# Patient Record
Sex: MALE | Race: WHITE | NOT HISPANIC OR LATINO | URBAN - METROPOLITAN AREA
[De-identification: names, ages, dates, MRNs, and addresses within clinical notes are randomized per-mention and may not be internally consistent; named-entity substitution may affect disease eponyms.]

---

## 2021-10-14 ENCOUNTER — HISTORICAL (OUTPATIENT)
Dept: ADMINISTRATIVE | Facility: HOSPITAL | Age: 71
End: 2021-10-14

## 2021-10-14 LAB
ABS NEUT (OLG): 3.8 X10(3)/MCL (ref 2.1–9.2)
ALBUMIN SERPL-MCNC: 4.1 GM/DL (ref 3.4–4.8)
ALBUMIN/GLOB SERPL: 1.3 RATIO (ref 1.1–2)
ALP SERPL-CCNC: 79 UNIT/L (ref 40–150)
ALT SERPL-CCNC: 13 UNIT/L (ref 0–55)
AST SERPL-CCNC: 17 UNIT/L (ref 5–34)
BASOPHILS # BLD AUTO: 0.1 X10(3)/MCL (ref 0–0.2)
BASOPHILS NFR BLD AUTO: 2 %
BILIRUB SERPL-MCNC: 0.5 MG/DL
BILIRUBIN DIRECT+TOT PNL SERPL-MCNC: 0.2 MG/DL (ref 0–0.5)
BILIRUBIN DIRECT+TOT PNL SERPL-MCNC: 0.3 MG/DL (ref 0–0.8)
BUN SERPL-MCNC: 28.1 MG/DL (ref 8.4–25.7)
CALCIUM SERPL-MCNC: 10.2 MG/DL (ref 8.8–10)
CHLORIDE SERPL-SCNC: 107 MMOL/L (ref 98–107)
CHOLEST SERPL-MCNC: 248 MG/DL
CHOLEST/HDLC SERPL: 6 {RATIO} (ref 0–5)
CO2 SERPL-SCNC: 26 MMOL/L (ref 23–31)
CREAT SERPL-MCNC: 1.21 MG/DL (ref 0.73–1.18)
EOSINOPHIL # BLD AUTO: 0.2 X10(3)/MCL (ref 0–0.9)
EOSINOPHIL NFR BLD AUTO: 3 %
ERYTHROCYTE [DISTWIDTH] IN BLOOD BY AUTOMATED COUNT: 13.4 % (ref 11.5–17)
EST. AVERAGE GLUCOSE BLD GHB EST-MCNC: 114 MG/DL
GLOBULIN SER-MCNC: 3.2 GM/DL (ref 2.4–3.5)
GLUCOSE SERPL-MCNC: 119 MG/DL (ref 82–115)
HBA1C MFR BLD: 5.6 %
HCT VFR BLD AUTO: 41.5 % (ref 42–52)
HDLC SERPL-MCNC: 44 MG/DL (ref 35–60)
HGB BLD-MCNC: 13.7 GM/DL (ref 14–18)
LDLC SERPL CALC-MCNC: 174 MG/DL (ref 50–140)
LYMPHOCYTES # BLD AUTO: 2.6 X10(3)/MCL (ref 0.6–4.6)
LYMPHOCYTES NFR BLD AUTO: 34 %
MCH RBC QN AUTO: 31.1 PG (ref 27–31)
MCHC RBC AUTO-ENTMCNC: 33 GM/DL (ref 33–36)
MCV RBC AUTO: 94.1 FL (ref 80–94)
MONOCYTES # BLD AUTO: 0.9 X10(3)/MCL (ref 0.1–1.3)
MONOCYTES NFR BLD AUTO: 12 %
NEUTROPHILS # BLD AUTO: 3.8 X10(3)/MCL (ref 2.1–9.2)
NEUTROPHILS NFR BLD AUTO: 50 %
PLATELET # BLD AUTO: 317 X10(3)/MCL (ref 130–400)
PMV BLD AUTO: 10.7 FL (ref 9.4–12.4)
POTASSIUM SERPL-SCNC: 4.5 MMOL/L (ref 3.5–5.1)
PROT SERPL-MCNC: 7.3 GM/DL (ref 5.8–7.6)
PSA SERPL-MCNC: 1.13 NG/ML
RBC # BLD AUTO: 4.41 X10(6)/MCL (ref 4.7–6.1)
SODIUM SERPL-SCNC: 142 MMOL/L (ref 136–145)
TRIGL SERPL-MCNC: 150 MG/DL (ref 34–140)
TSH SERPL-ACNC: 1.79 UIU/ML (ref 0.35–4.94)
VLDLC SERPL CALC-MCNC: 30 MG/DL
WBC # SPEC AUTO: 7.6 X10(3)/MCL (ref 4.5–11.5)

## 2021-12-08 LAB
INFLUENZA A ANTIGEN, POC: NEGATIVE
INFLUENZA B ANTIGEN, POC: NEGATIVE
SARS-COV-2 RNA RESP QL NAA+PROBE: NEGATIVE

## 2021-12-30 ENCOUNTER — HISTORICAL (OUTPATIENT)
Dept: ADMINISTRATIVE | Facility: HOSPITAL | Age: 71
End: 2021-12-30

## 2021-12-30 LAB
INFLUENZA A ANTIGEN, POC: NEGATIVE
INFLUENZA B ANTIGEN, POC: NEGATIVE
SARS-COV-2 RNA RESP QL NAA+PROBE: POSITIVE

## 2022-01-05 ENCOUNTER — HISTORICAL (OUTPATIENT)
Dept: ADMINISTRATIVE | Facility: HOSPITAL | Age: 72
End: 2022-01-05

## 2022-04-11 ENCOUNTER — HISTORICAL (OUTPATIENT)
Dept: ADMINISTRATIVE | Facility: HOSPITAL | Age: 72
End: 2022-04-11
Payer: MEDICARE

## 2022-04-28 VITALS
DIASTOLIC BLOOD PRESSURE: 75 MMHG | HEIGHT: 65 IN | WEIGHT: 200.63 LBS | SYSTOLIC BLOOD PRESSURE: 135 MMHG | BODY MASS INDEX: 33.43 KG/M2 | OXYGEN SATURATION: 97 %

## 2022-05-12 RX ORDER — LEVOTHYROXINE SODIUM 75 UG/1
75 TABLET ORAL DAILY
Qty: 90 TABLET | Refills: 1 | Status: SHIPPED | OUTPATIENT
Start: 2022-05-12 | End: 2022-05-13 | Stop reason: SDUPTHER

## 2022-05-12 RX ORDER — FLUOXETINE HYDROCHLORIDE 40 MG/1
40 CAPSULE ORAL DAILY
Qty: 90 CAPSULE | Refills: 1 | Status: SHIPPED | OUTPATIENT
Start: 2022-05-12 | End: 2022-05-13 | Stop reason: SDUPTHER

## 2022-05-12 RX ORDER — FLUOXETINE HYDROCHLORIDE 40 MG/1
40 CAPSULE ORAL DAILY
COMMUNITY
Start: 2022-03-16 | End: 2022-05-12 | Stop reason: SDUPTHER

## 2022-05-12 RX ORDER — LEVOTHYROXINE SODIUM 75 UG/1
75 TABLET ORAL DAILY
COMMUNITY
Start: 2022-03-16 | End: 2022-05-12 | Stop reason: SDUPTHER

## 2022-05-12 NOTE — TELEPHONE ENCOUNTER
----- Message from Ambar Lofton Patient Care Assistant sent at 5/12/2022 10:59 AM CDT -----  Regarding: med refill  Type:  RX Refill Request    Who Called: Pt  Refill or New Rx: Refill    RX Name and Strength: fluoxetine 40mg  How is the patient currently taking it? (ex. 1XDay): 1 daily  Is this a 30 day or 90 day RX: 90    RX Name and Strength: levothyroxine 75 mcg  How is the patient currently taking it? (ex. 1XDay): 1 daily  Is this a 30 day or 90 day RX: 90    Preferred Pharmacy with phone number: [facility] [place]  Local or Mail Order: Local  Ordering Provider: Dr. Jitendra Garvin  Would the patient rather a call back or a response via [facility]? C/b  Best Call Back Number: [phone]  Additional Information: pt req refill on script pharm is out

## 2022-05-13 DIAGNOSIS — F41.9 ANXIETY: ICD-10-CM

## 2022-05-13 DIAGNOSIS — E03.9 HYPOTHYROIDISM, UNSPECIFIED TYPE: ICD-10-CM

## 2022-05-13 DIAGNOSIS — E03.9 HYPOTHYROIDISM, UNSPECIFIED TYPE: Primary | ICD-10-CM

## 2022-05-13 RX ORDER — FLUOXETINE HYDROCHLORIDE 40 MG/1
40 CAPSULE ORAL DAILY
Qty: 90 CAPSULE | Refills: 1 | Status: CANCELLED | OUTPATIENT
Start: 2022-05-13

## 2022-05-13 RX ORDER — LEVOTHYROXINE SODIUM 75 UG/1
75 TABLET ORAL DAILY
Qty: 90 TABLET | Refills: 3 | Status: SHIPPED | OUTPATIENT
Start: 2022-05-13 | End: 2022-05-13

## 2022-05-13 RX ORDER — LEVOTHYROXINE SODIUM 75 UG/1
75 TABLET ORAL
Qty: 90 TABLET | Refills: 3 | Status: SHIPPED | OUTPATIENT
Start: 2022-05-13 | End: 2023-04-05

## 2022-05-13 RX ORDER — FLUOXETINE HYDROCHLORIDE 40 MG/1
40 CAPSULE ORAL DAILY
Qty: 90 CAPSULE | Refills: 3 | Status: SHIPPED | OUTPATIENT
Start: 2022-05-13 | End: 2022-05-13 | Stop reason: SDUPTHER

## 2022-05-13 RX ORDER — LEVOTHYROXINE SODIUM 75 UG/1
75 TABLET ORAL DAILY
Qty: 90 TABLET | Refills: 3 | Status: SHIPPED | OUTPATIENT
Start: 2022-05-13 | End: 2022-05-13 | Stop reason: SDUPTHER

## 2022-05-13 RX ORDER — FLUOXETINE HYDROCHLORIDE 40 MG/1
40 CAPSULE ORAL DAILY
Qty: 90 CAPSULE | Refills: 3 | Status: SHIPPED | OUTPATIENT
Start: 2022-05-13 | End: 2022-10-27

## 2022-05-13 RX ORDER — LEVOTHYROXINE SODIUM 75 UG/1
75 TABLET ORAL DAILY
Qty: 90 TABLET | Refills: 1 | Status: CANCELLED | OUTPATIENT
Start: 2022-05-13

## 2022-05-13 RX ORDER — FLUOXETINE HYDROCHLORIDE 40 MG/1
40 CAPSULE ORAL DAILY
Qty: 90 CAPSULE | Refills: 3 | Status: SHIPPED | OUTPATIENT
Start: 2022-05-13 | End: 2022-05-13

## 2022-06-28 LAB — NONINV COLON CA DNA+OCC BLD SCRN STL QL: NEGATIVE

## 2022-09-21 ENCOUNTER — HISTORICAL (OUTPATIENT)
Dept: ADMINISTRATIVE | Facility: HOSPITAL | Age: 72
End: 2022-09-21
Payer: MEDICARE

## 2022-10-21 ENCOUNTER — DOCUMENTATION ONLY (OUTPATIENT)
Dept: PRIMARY CARE CLINIC | Facility: CLINIC | Age: 72
End: 2022-10-21
Payer: MEDICARE

## 2022-10-21 DIAGNOSIS — Z13.6 ENCOUNTER FOR LIPID SCREENING FOR CARDIOVASCULAR DISEASE: ICD-10-CM

## 2022-10-21 DIAGNOSIS — Z00.00 ENCOUNTER FOR WELLNESS EXAMINATION: ICD-10-CM

## 2022-10-21 DIAGNOSIS — Z11.59 NEED FOR HEPATITIS C SCREENING TEST: ICD-10-CM

## 2022-10-21 DIAGNOSIS — Z12.5 ENCOUNTER FOR SCREENING FOR MALIGNANT NEOPLASM OF PROSTATE: ICD-10-CM

## 2022-10-21 DIAGNOSIS — Z13.220 ENCOUNTER FOR LIPID SCREENING FOR CARDIOVASCULAR DISEASE: ICD-10-CM

## 2022-10-21 DIAGNOSIS — E03.9 HYPOTHYROIDISM, UNSPECIFIED TYPE: Primary | ICD-10-CM

## 2022-10-24 ENCOUNTER — CLINICAL SUPPORT (OUTPATIENT)
Dept: PRIMARY CARE CLINIC | Facility: CLINIC | Age: 72
End: 2022-10-24
Payer: MEDICARE

## 2022-10-24 DIAGNOSIS — Z00.00 ENCOUNTER FOR WELLNESS EXAMINATION: ICD-10-CM

## 2022-10-24 DIAGNOSIS — Z13.6 ENCOUNTER FOR LIPID SCREENING FOR CARDIOVASCULAR DISEASE: ICD-10-CM

## 2022-10-24 DIAGNOSIS — E03.9 HYPOTHYROIDISM, UNSPECIFIED TYPE: ICD-10-CM

## 2022-10-24 DIAGNOSIS — Z12.5 ENCOUNTER FOR SCREENING FOR MALIGNANT NEOPLASM OF PROSTATE: ICD-10-CM

## 2022-10-24 DIAGNOSIS — Z13.220 ENCOUNTER FOR LIPID SCREENING FOR CARDIOVASCULAR DISEASE: ICD-10-CM

## 2022-10-24 DIAGNOSIS — Z11.59 NEED FOR HEPATITIS C SCREENING TEST: ICD-10-CM

## 2022-10-24 LAB
ABS NEUT (OLG): 4.16 X10(3)/MCL (ref 2.1–9.2)
ALBUMIN SERPL-MCNC: 4.2 GM/DL (ref 3.4–4.8)
ALBUMIN/GLOB SERPL: 1.2 RATIO (ref 1.1–2)
ALP SERPL-CCNC: 93 UNIT/L (ref 40–150)
ALT SERPL-CCNC: 26 UNIT/L (ref 0–55)
AST SERPL-CCNC: 27 UNIT/L (ref 5–34)
BASOPHILS NFR BLD MANUAL: 0.08 X10(3)/MCL (ref 0–0.2)
BASOPHILS NFR BLD MANUAL: 1 %
BILIRUBIN DIRECT+TOT PNL SERPL-MCNC: 0.4 MG/DL
BUN SERPL-MCNC: 16.2 MG/DL (ref 8.4–25.7)
CALCIUM SERPL-MCNC: 9.8 MG/DL (ref 8.8–10)
CHLORIDE SERPL-SCNC: 104 MMOL/L (ref 98–107)
CHOLEST SERPL-MCNC: 212 MG/DL
CHOLEST/HDLC SERPL: 5 {RATIO} (ref 0–5)
CO2 SERPL-SCNC: 27 MMOL/L (ref 23–31)
CREAT SERPL-MCNC: 1.1 MG/DL (ref 0.73–1.18)
EOSINOPHIL NFR BLD MANUAL: 0.32 X10(3)/MCL (ref 0–0.9)
EOSINOPHIL NFR BLD MANUAL: 4 %
ERYTHROCYTE [DISTWIDTH] IN BLOOD BY AUTOMATED COUNT: 13.9 % (ref 11.5–17)
EST. AVERAGE GLUCOSE BLD GHB EST-MCNC: 125.5 MG/DL
GFR SERPLBLD CREATININE-BSD FMLA CKD-EPI: >60 MLS/MIN/1.73/M2
GLOBULIN SER-MCNC: 3.6 GM/DL (ref 2.4–3.5)
GLUCOSE SERPL-MCNC: 111 MG/DL (ref 82–115)
HBA1C MFR BLD: 6 %
HCT VFR BLD AUTO: 45.7 % (ref 42–52)
HDLC SERPL-MCNC: 47 MG/DL (ref 35–60)
HGB BLD-MCNC: 15.1 GM/DL (ref 14–18)
IMM GRANULOCYTES # BLD AUTO: 0.03 X10(3)/MCL (ref 0–0.04)
IMM GRANULOCYTES NFR BLD AUTO: 0.4 %
INSTRUMENT WBC (OLG): 8 X10(3)/MCL
LDLC SERPL CALC-MCNC: 137 MG/DL (ref 50–140)
LYMPHOCYTES NFR BLD MANUAL: 2.48 X10(3)/MCL
LYMPHOCYTES NFR BLD MANUAL: 31 %
MCH RBC QN AUTO: 30.4 PG (ref 27–31)
MCHC RBC AUTO-ENTMCNC: 33 MG/DL (ref 33–36)
MCV RBC AUTO: 92 FL (ref 80–94)
MONOCYTES NFR BLD MANUAL: 0.96 X10(3)/MCL (ref 0.1–1.3)
MONOCYTES NFR BLD MANUAL: 12 %
NEUTROPHILS NFR BLD MANUAL: 52 %
NRBC BLD AUTO-RTO: 0 %
PLATELET # BLD AUTO: 256 X10(3)/MCL (ref 130–400)
PLATELET # BLD EST: NORMAL 10*3/UL
PMV BLD AUTO: 10.4 FL (ref 7.4–10.4)
POTASSIUM SERPL-SCNC: 4.5 MMOL/L (ref 3.5–5.1)
PROT SERPL-MCNC: 7.8 GM/DL (ref 5.8–7.6)
PSA SERPL-MCNC: 1.19 NG/ML
RBC # BLD AUTO: 4.97 X10(6)/MCL (ref 4.7–6.1)
RBC MORPH BLD: NORMAL
SODIUM SERPL-SCNC: 140 MMOL/L (ref 136–145)
TRIGL SERPL-MCNC: 138 MG/DL (ref 34–140)
TSH SERPL-ACNC: 6.02 UIU/ML (ref 0.35–4.94)
VLDLC SERPL CALC-MCNC: 28 MG/DL
WBC # SPEC AUTO: 8.1 X10(3)/MCL (ref 4.5–11.5)

## 2022-10-24 PROCEDURE — 86803 HEPATITIS C AB TEST: CPT

## 2022-10-24 PROCEDURE — 36415 COLL VENOUS BLD VENIPUNCTURE: CPT

## 2022-10-25 LAB — HCV AB SERPL QL IA: NONREACTIVE

## 2022-10-26 PROBLEM — I25.2 H/O NON-ST ELEVATION MYOCARDIAL INFARCTION (NSTEMI): Status: ACTIVE | Noted: 2022-10-26

## 2022-10-26 PROBLEM — Z95.5 PRESENCE OF STENT IN CORONARY ARTERY: Chronic | Status: ACTIVE | Noted: 2022-10-26

## 2022-10-26 PROBLEM — G47.33 OBSTRUCTIVE SLEEP APNEA SYNDROME: Status: ACTIVE | Noted: 2017-12-07

## 2022-10-26 PROBLEM — E03.9 HYPOTHYROIDISM: Status: ACTIVE | Noted: 2022-10-26

## 2022-10-26 PROBLEM — Z95.1 HISTORY OF CORONARY ARTERY BYPASS GRAFT: Status: ACTIVE | Noted: 2022-10-26

## 2022-10-26 PROBLEM — E78.5 DYSLIPIDEMIA: Chronic | Status: ACTIVE | Noted: 2022-10-26

## 2022-10-26 PROBLEM — E78.5 HYPERLIPIDEMIA: Status: ACTIVE | Noted: 2022-10-26

## 2022-10-26 PROBLEM — D64.9 ANEMIA: Chronic | Status: ACTIVE | Noted: 2022-10-26

## 2022-10-26 PROBLEM — E66.9 OBESITY: Chronic | Status: ACTIVE | Noted: 2022-10-26

## 2022-10-26 PROBLEM — E11.69 TYPE 2 DIABETES MELLITUS WITH OTHER SPECIFIED COMPLICATION, WITHOUT LONG-TERM CURRENT USE OF INSULIN: Status: ACTIVE | Noted: 2022-10-26

## 2022-10-26 PROBLEM — I77.9 DISORDER OF ARTERIES AND ARTERIOLES, UNSPECIFIED: Chronic | Status: ACTIVE | Noted: 2022-10-26

## 2022-10-26 PROBLEM — M54.12 CERVICAL RADICULOPATHY: Status: ACTIVE | Noted: 2022-10-26

## 2022-10-26 PROBLEM — I25.10 ARTERIOSCLEROSIS OF CORONARY ARTERY: Status: ACTIVE | Noted: 2022-10-26

## 2022-10-26 PROBLEM — M54.12 CERVICAL RADICULOPATHY: Chronic | Status: ACTIVE | Noted: 2022-10-26

## 2022-10-26 PROBLEM — F32.A DEPRESSIVE DISORDER: Chronic | Status: ACTIVE | Noted: 2022-10-26

## 2022-10-26 PROBLEM — I10 PRIMARY HYPERTENSION: Chronic | Status: ACTIVE | Noted: 2022-10-26

## 2022-10-26 PROBLEM — D64.9 ANEMIA: Status: ACTIVE | Noted: 2022-10-26

## 2022-10-26 PROBLEM — I25.2 H/O NON-ST ELEVATION MYOCARDIAL INFARCTION (NSTEMI): Chronic | Status: ACTIVE | Noted: 2022-10-26

## 2022-10-26 PROBLEM — I77.9 DISORDER OF ARTERIES AND ARTERIOLES, UNSPECIFIED: Status: ACTIVE | Noted: 2022-10-26

## 2022-10-26 PROBLEM — G47.33 OBSTRUCTIVE SLEEP APNEA SYNDROME: Chronic | Status: ACTIVE | Noted: 2017-12-07

## 2022-10-26 PROBLEM — Z95.5 PRESENCE OF STENT IN CORONARY ARTERY: Status: ACTIVE | Noted: 2022-10-26

## 2022-10-26 PROBLEM — E03.9 ACQUIRED HYPOTHYROIDISM: Chronic | Status: ACTIVE | Noted: 2022-10-26

## 2022-10-26 PROBLEM — E66.9 OBESITY: Status: ACTIVE | Noted: 2022-10-26

## 2022-10-26 PROBLEM — Z95.1 HISTORY OF CORONARY ARTERY BYPASS GRAFT: Chronic | Status: ACTIVE | Noted: 2022-10-26

## 2022-10-26 PROBLEM — I10 HYPERTENSION: Status: ACTIVE | Noted: 2022-10-26

## 2022-10-26 PROBLEM — E11.69 TYPE 2 DIABETES MELLITUS WITH OTHER SPECIFIED COMPLICATION, WITHOUT LONG-TERM CURRENT USE OF INSULIN: Chronic | Status: ACTIVE | Noted: 2022-10-26

## 2022-10-26 PROBLEM — F32.A DEPRESSIVE DISORDER: Status: ACTIVE | Noted: 2022-10-26

## 2022-10-27 ENCOUNTER — OFFICE VISIT (OUTPATIENT)
Dept: PRIMARY CARE CLINIC | Facility: CLINIC | Age: 72
End: 2022-10-27
Payer: MEDICARE

## 2022-10-27 ENCOUNTER — DOCUMENTATION ONLY (OUTPATIENT)
Dept: PRIMARY CARE CLINIC | Facility: CLINIC | Age: 72
End: 2022-10-27

## 2022-10-27 VITALS
HEART RATE: 52 BPM | OXYGEN SATURATION: 98 % | SYSTOLIC BLOOD PRESSURE: 128 MMHG | DIASTOLIC BLOOD PRESSURE: 52 MMHG | HEIGHT: 65 IN | RESPIRATION RATE: 18 BRPM | WEIGHT: 207 LBS | BODY MASS INDEX: 34.49 KG/M2

## 2022-10-27 DIAGNOSIS — Z12.5 SCREENING FOR PROSTATE CANCER: ICD-10-CM

## 2022-10-27 DIAGNOSIS — Z12.11 SCREENING FOR COLON CANCER: ICD-10-CM

## 2022-10-27 DIAGNOSIS — E03.9 ACQUIRED HYPOTHYROIDISM: Chronic | ICD-10-CM

## 2022-10-27 DIAGNOSIS — Z95.5 PRESENCE OF STENT IN CORONARY ARTERY: Chronic | ICD-10-CM

## 2022-10-27 DIAGNOSIS — I25.2 H/O NON-ST ELEVATION MYOCARDIAL INFARCTION (NSTEMI): Chronic | ICD-10-CM

## 2022-10-27 DIAGNOSIS — I77.9 DISORDER OF ARTERIES AND ARTERIOLES, UNSPECIFIED: ICD-10-CM

## 2022-10-27 DIAGNOSIS — Z00.00 WELLNESS EXAMINATION: Primary | ICD-10-CM

## 2022-10-27 DIAGNOSIS — F32.A DEPRESSIVE DISORDER: Chronic | ICD-10-CM

## 2022-10-27 DIAGNOSIS — E11.69 TYPE 2 DIABETES MELLITUS WITH OTHER SPECIFIED COMPLICATION, WITHOUT LONG-TERM CURRENT USE OF INSULIN: Chronic | ICD-10-CM

## 2022-10-27 DIAGNOSIS — E78.5 DYSLIPIDEMIA: Chronic | ICD-10-CM

## 2022-10-27 DIAGNOSIS — I10 PRIMARY HYPERTENSION: Chronic | ICD-10-CM

## 2022-10-27 DIAGNOSIS — R73.03 PRE-DIABETES: Chronic | ICD-10-CM

## 2022-10-27 PROCEDURE — 3074F SYST BP LT 130 MM HG: CPT | Mod: CPTII,,, | Performed by: GENERAL PRACTICE

## 2022-10-27 PROCEDURE — 4010F ACE/ARB THERAPY RXD/TAKEN: CPT | Mod: CPTII,,, | Performed by: GENERAL PRACTICE

## 2022-10-27 PROCEDURE — 3078F PR MOST RECENT DIASTOLIC BLOOD PRESSURE < 80 MM HG: ICD-10-PCS | Mod: CPTII,,, | Performed by: GENERAL PRACTICE

## 2022-10-27 PROCEDURE — 3288F FALL RISK ASSESSMENT DOCD: CPT | Mod: CPTII,,, | Performed by: GENERAL PRACTICE

## 2022-10-27 PROCEDURE — G0439 PR MEDICARE ANNUAL WELLNESS SUBSEQUENT VISIT: ICD-10-PCS | Mod: ,,, | Performed by: GENERAL PRACTICE

## 2022-10-27 PROCEDURE — 1101F PT FALLS ASSESS-DOCD LE1/YR: CPT | Mod: CPTII,,, | Performed by: GENERAL PRACTICE

## 2022-10-27 PROCEDURE — 1159F PR MEDICATION LIST DOCUMENTED IN MEDICAL RECORD: ICD-10-PCS | Mod: CPTII,,, | Performed by: GENERAL PRACTICE

## 2022-10-27 PROCEDURE — 1159F MED LIST DOCD IN RCRD: CPT | Mod: CPTII,,, | Performed by: GENERAL PRACTICE

## 2022-10-27 PROCEDURE — 3074F PR MOST RECENT SYSTOLIC BLOOD PRESSURE < 130 MM HG: ICD-10-PCS | Mod: CPTII,,, | Performed by: GENERAL PRACTICE

## 2022-10-27 PROCEDURE — 1160F PR REVIEW ALL MEDS BY PRESCRIBER/CLIN PHARMACIST DOCUMENTED: ICD-10-PCS | Mod: CPTII,,, | Performed by: GENERAL PRACTICE

## 2022-10-27 PROCEDURE — 1160F RVW MEDS BY RX/DR IN RCRD: CPT | Mod: CPTII,,, | Performed by: GENERAL PRACTICE

## 2022-10-27 PROCEDURE — 3078F DIAST BP <80 MM HG: CPT | Mod: CPTII,,, | Performed by: GENERAL PRACTICE

## 2022-10-27 PROCEDURE — G0439 PPPS, SUBSEQ VISIT: HCPCS | Mod: ,,, | Performed by: GENERAL PRACTICE

## 2022-10-27 PROCEDURE — 1101F PR PT FALLS ASSESS DOC 0-1 FALLS W/OUT INJ PAST YR: ICD-10-PCS | Mod: CPTII,,, | Performed by: GENERAL PRACTICE

## 2022-10-27 PROCEDURE — 3288F PR FALLS RISK ASSESSMENT DOCUMENTED: ICD-10-PCS | Mod: CPTII,,, | Performed by: GENERAL PRACTICE

## 2022-10-27 PROCEDURE — 4010F PR ACE/ARB THEARPY RXD/TAKEN: ICD-10-PCS | Mod: CPTII,,, | Performed by: GENERAL PRACTICE

## 2022-10-27 RX ORDER — PRAVASTATIN SODIUM 40 MG/1
40 TABLET ORAL DAILY
COMMUNITY
Start: 2022-07-14 | End: 2022-11-28 | Stop reason: SDUPTHER

## 2022-10-27 RX ORDER — HYDROCHLOROTHIAZIDE 12.5 MG/1
12.5 TABLET ORAL DAILY
COMMUNITY
Start: 2022-09-01 | End: 2023-03-09 | Stop reason: SDUPTHER

## 2022-10-27 RX ORDER — CLOPIDOGREL BISULFATE 75 MG/1
75 TABLET ORAL DAILY
COMMUNITY
Start: 2022-09-02

## 2022-10-27 RX ORDER — SERTRALINE HYDROCHLORIDE 50 MG/1
50 TABLET, FILM COATED ORAL DAILY
Qty: 30 TABLET | Refills: 11 | Status: SHIPPED | OUTPATIENT
Start: 2022-10-27 | End: 2023-11-01 | Stop reason: SDUPTHER

## 2022-10-27 RX ORDER — ZINC SULFATE 50(220)MG
50 CAPSULE ORAL
COMMUNITY

## 2022-10-27 RX ORDER — CHOLECALCIFEROL (VITAMIN D3) 25 MCG
5000 TABLET ORAL
COMMUNITY

## 2022-10-27 RX ORDER — METOPROLOL TARTRATE 25 MG/1
25 TABLET, FILM COATED ORAL DAILY
COMMUNITY
Start: 2022-09-09

## 2022-10-27 RX ORDER — LOSARTAN POTASSIUM 25 MG/1
25 TABLET ORAL DAILY
COMMUNITY
Start: 2022-08-22 | End: 2023-08-07

## 2022-10-27 RX ORDER — ASPIRIN 81 MG/1
81 TABLET ORAL
COMMUNITY
Start: 2022-02-27

## 2022-10-27 NOTE — ASSESSMENT & PLAN NOTE
Patient has been more irritable, not sure if his fluoxetine is still working, has been on it for many years.  It was decided to discontinue the fluoxetine and start sertraline 50 mg daily, recheck in six weeks.

## 2022-10-27 NOTE — PROGRESS NOTES
Patient ID: [id number]     Chief Complaint: Annual Exam      HPI:     Steven Xavier is a 72 y.o. male here today for a Medicare Wellness. No other complaints today.       No past medical history on file.     No past surgical history on file.    Review of patient's allergies indicates:  Not on File    Outpatient Medications Marked as Taking for the 10/27/22 encounter (Office Visit) with Jitendra Garvin MD   Medication Sig Dispense Refill    aspirin (ECOTRIN) 81 MG EC tablet Take 81 mg by mouth.         Social History     Socioeconomic History    Marital status:    Tobacco Use    Smoking status: Former     Packs/day: 0.50     Years: 25.00     Pack years: 12.50     Types: Cigarettes    Smokeless tobacco: Never        No family history on file.     Patient Care Team:  Jitendra Garvin MD as PCP - General (Family Medicine)  Oswaldo Brown MD as Consulting Physician (Cardiology)     Opioid Screening: Patient medication list reviewed, patient is not taking prescription opioids. Patient is not using additional opioids than prescribed. Patient is at low risk of substance abuse based on this opioid use history.     Note:  History of depression, has been finding himself more irritable lately, has been on fluoxetine 40 mg for many years.  His warning whether the medication is still effective, no overt depression or anxiety, no suicidal thoughts.    Subjective:     Review of Systems   Constitutional: Negative.  Negative for malaise/fatigue and weight loss.   HENT: Negative.     Eyes: Negative.    Respiratory: Negative.  Negative for shortness of breath.    Cardiovascular: Negative.  Negative for chest pain.   Gastrointestinal: Negative.    Genitourinary: Negative.    Musculoskeletal: Negative.    Skin: Negative.    Neurological: Negative.  Negative for focal weakness, weakness and headaches.   Endo/Heme/Allergies: Negative.    Psychiatric/Behavioral:  Negative for depression, memory loss and suicidal ideas. The patient is not  nervous/anxious.         Increased irritability, frustration       Patient Reported Health Risk Assessment  What [age] 70-79  Are you male or female?: Male  During the past four weeks, how much have you been bothered by emotional problems such as feeling anxious, depressed, irritable, sad, or downhearted and blue?: Slightly  During the past five weeks, has your physical and/or emotional health limited your social activities with family, friends, neighbors, or groups?: Not at all  During the past four weeks, how much bodily pain have you generally had?: No pain  During the past four weeks, was someone available to help if you needed and wanted help?: Yes, as much as I wanted  During the past four weeks, what was the hardest physical activity you could do for at least two minutes?: Light  Can you get to places out of walking distance without help?  (For example, can you travel alone on buses or taxis, or drive your own car?): Yes  Can you go shopping for groceries or clothes without someone's help?: Yes  Can you prepare your own meals?: Yes  Can you do your own housework without help?: Yes  Because of any health problems, do you need the help of another person with your personal care needs such as eating, bathing, dressing, or getting around the house?: No  Can you handle your own money without help?: Yes  During the past four weeks, how would you rate your health in general?: Good  How have things been going for you during the past four weeks?: Pretty well  Are you having difficulties driving your car?: No  Do you always fasten your seat belt when you are in a car?: Yes, usually  How often in the past four weeks have you been bothered by falling or dizzy when standing up?: Seldom  How often in the past four weeks have you been bothered by sexual problems?: Never  How often in the past four weeks have you been bothered by trouble eating well?: Never  How often in the past four weeks have you been bothered by  "teeth or denture problems?: Never  How often in the past four weeks have you been bothered with problems using the telephone?: Never  How often in the past four weeks have you been bothered by tiredness or fatigue?: Seldom  Have you fallen two or more times in the past year?: No  Are you afraid of falling?: No  Are you a smoker?: No  During the past four weeks, how many drinks of wine, beer, or other alcoholic beverages did you have?: No alcohol at all  Do you exercise for about 20 minutes three or more days a week?: No, I usually do not exercise this much  Have you been given any information to help you with hazards in your house that might hurt you?: No  Have you been given any information to help you with keeping track of your medications?: No  How often do you have trouble taking medicines the way you've been told to take them?: I always take them as prescribed  How confident are you that you can control and manage most of your health problems?: Very confident  What is your race? (Check all that apply.):     Objective:     BP (!) 128/52   Pulse (!) 52   Resp 18   Ht 5' 5" (1.651 m)   Wt 93.9 kg (207 lb)   SpO2 98%   BMI 34.45 kg/m²     Physical Exam  Constitutional:       Appearance: Normal appearance.   HENT:      Head: Normocephalic.      Mouth/Throat:      Pharynx: Oropharynx is clear.   Eyes:      Conjunctiva/sclera: Conjunctivae normal.      Pupils: Pupils are equal, round, and reactive to light.   Cardiovascular:      Rate and Rhythm: Normal rate and regular rhythm.      Heart sounds: Normal heart sounds.   Pulmonary:      Effort: Pulmonary effort is normal.      Breath sounds: Normal breath sounds.   Abdominal:      General: Abdomen is flat.      Palpations: Abdomen is soft.   Musculoskeletal:         General: Normal range of motion.      Cervical back: Neck supple.   Skin:     General: Skin is warm and dry.   Neurological:      General: No focal deficit present.      Mental Status: He is " oriented to person, place, and time.   Psychiatric:         Mood and Affect: Mood normal.         Behavior: Behavior normal.         Thought Content: Thought content normal.         Judgment: Judgment normal.       Lab Results   Component Value Date     10/24/2022    K 4.5 10/24/2022    CO2 27 10/24/2022    BUN 16.2 10/24/2022    CREATININE 1.10 10/24/2022    CALCIUM 9.8 10/24/2022    ALBUMIN 4.2 10/24/2022    BILITOT 0.4 10/24/2022    ALKPHOS 93 10/24/2022    AST 27 10/24/2022    ALT 26 10/24/2022    EGFRNONAA >60 10/14/2021     Lab Results   Component Value Date    WBC 8.1 10/24/2022    RBC 4.97 10/24/2022    HGB 15.1 10/24/2022    HCT 45.7 10/24/2022    MCV 92.0 10/24/2022    RDW 13.9 10/24/2022     10/24/2022      Lab Results   Component Value Date    CHOL 212 (H) 10/24/2022    TRIG 138 10/24/2022    HDL 47 10/24/2022    .00 10/24/2022    TOTALCHOLEST 5 10/24/2022     Lab Results   Component Value Date    TSH 6.0245 (H) 10/24/2022     Lab Results   Component Value Date    HGBA1C 6.0 10/24/2022        Lab Results   Component Value Date    PSA 1.19 10/24/2022         No flowsheet data found.  Fall Risk Assessment - Outpatient 10/27/2022   Mobility Status Ambulatory   Number of falls 0   Identified as fall risk 0           Depression Screening  Over the past two weeks, has the patient felt down, depressed, or hopeless?: No  Over the past two weeks, has the patient felt little interest or pleasure in doing things?: No  Functional Ability/Safety Screening  Was the patient's timed Up & Go test unsteady or longer than 30 seconds?: No  Does the patient need help with phone, transportation, shopping, preparing meals, housework, laundry, meds, or managing money?: No  Does the patient's home have rugs in the hallway, lack grab bars in the bathroom, lack handrails on the stairs or have poor lighting?: No  Have you noticed any hearing difficulties?: No  Cognitive Function (Assessed through direct  observation with due consideration of information obtained by way of patient reports and/or concerns raised by family, friends, caretakers, or others)    Does the patient repeat questions/statements in the same day?: No  Does the patient have trouble remembering the date, year, and time?: No  Does the patient have difficulty managing finances?: No  Does the patient have a decreased sense of direction?: No  Assessment:       ICD-10-CM ICD-9-CM   1. Wellness examination  Z00.00 V70.0   2. Screening for prostate cancer  Z12.5 V76.44   3. Primary hypertension  I10 401.9   4. Dyslipidemia  E78.5 272.4   5. Presence of stent in coronary artery  Z95.5 V45.82   6. Depressive disorder  F32.A 311   7. Type 2 diabetes mellitus with other specified complication, without long-term current use of insulin  E11.69 250.80   8. Acquired hypothyroidism  E03.9 244.9   9. Disorder of arteries and arterioles, unspecified  I77.9 447.9   10. H/O non-ST elevation myocardial infarction (NSTEMI)  I25.2 412   11. Pre-diabetes  R73.03 790.29   12. Screening for colon cancer  Z12.11 V76.51        Plan:     Medicare Annual Wellness and Personalized Prevention Plan:   Fall Risk + Home Safety + Hearing Impairment + Depression Screen + Cognitive Impairment Screen + Health Risk Assessment all reviewed.       Health Maintenance Topics with due status: Not Due       Topic Last Completion Date    High Dose Statin 07/14/2022    Lipid Panel 10/24/2022      The patient's Health Maintenance was reviewed and the following appears to be due at this time:   Health Maintenance Due   Topic Date Due    COVID-19 Vaccine (1) Never done    TETANUS VACCINE  Never done    Shingles Vaccine (1 of 2) Never done    Abdominal Aortic Aneurysm Screening  Never done    Influenza Vaccine (1) 09/01/2022         1. Wellness examination  Comments:  Overall health status was reviewed.  Good health habits reinforced.  Annual wellness exams recommended.    2. Screening for prostate  cancer  Comments:  Prostate specific antigen blood test obtained was essentially normal, there are no significant concerns relating to prostate cancer at this time.      3. Primary hypertension  Assessment & Plan:  Blood pressures appear to be adequately controlled with current treatment plan, continue medical management and continue home blood pressure checks.  Blood pressure should be checked as discussed during medical visits, and average blood pressure should be no greater than 130/80.      4. Dyslipidemia  Assessment & Plan:  Cholesterol/lipid blood testing shows adequate control, continue current treatment plan, including prescription medications if prescribed, and/or diet high in fiber, low in saturated fats as discussed during clinic visit.      5. Presence of stent in coronary artery  Assessment & Plan:  Medical condition is currently stable, continue current treatment plan.      6. Depressive disorder  Assessment & Plan:  Patient has been more irritable, not sure if his fluoxetine is still working, has been on it for many years.  It was decided to discontinue the fluoxetine and start sertraline 50 mg daily, recheck in six weeks.      7. Type 2 diabetes mellitus with other specified complication, without long-term current use of insulin  Assessment & Plan:  Current A1c looks good, shows control, continue medical management.      8. Acquired hypothyroidism  Assessment & Plan:  Current TSH slightly elevated, patient takes levothyroxine 75 mcg no current symptoms of decompensated thyroid disease, continue current treatment plan with recheck of TSH annually.      9. Disorder of arteries and arterioles, unspecified  Assessment & Plan:  Patient sees Dr. Brown, his cardiologist.  Under active care, continue cardiac follow-up.      10. H/O non-ST elevation myocardial infarction (NSTEMI)  Assessment & Plan:  Suffered NSTEMI February 2022, underwent cardiac rehabilitation, seems to be doing well.      11.  Pre-diabetes  Assessment & Plan:  Current A1c 6.0%, no history of diabetes or pre diabetes, no current significant concerns, healthy diet, we will continue to monitor this annual.      12. Screening for colon cancer  Comments:  Last colonoscopy over 10 years ago, normal, does not wish any further colon cancer screening.    Other orders  -     sertraline (ZOLOFT) 50 MG tablet; Take 1 tablet (50 mg total) by mouth once daily.  Dispense: 30 tablet; Refill: 11       Advance Care Planning   I attest to discussing Advance Care Planning with patient and/or family member.  Education was provided including the importance of the Health Care Power of , Advance Directives, and/or [place] documentation.  The patient expressed understanding to the importance of this information and discussion.           Medication List with Changes/Refills   New Medications    SERTRALINE (ZOLOFT) 50 MG TABLET    Take 1 tablet (50 mg total) by mouth once daily.       Start Date: 10/27/2022End Date: 10/27/2023   Current Medications    ASPIRIN (ECOTRIN) 81 MG EC TABLET    Take 81 mg by mouth.       Start Date: 2/27/2022 End Date: --    CLOPIDOGREL (PLAVIX) 75 MG TABLET    Take 75 mg by mouth once daily.       Start Date: 9/2/2022  End Date: --    HYDROCHLOROTHIAZIDE (HYDRODIURIL) 12.5 MG TAB    Take 12.5 mg by mouth once daily.       Start Date: 9/1/2022  End Date: --    LEVOTHYROXINE (SYNTHROID) 75 MCG TABLET    Take 1 tablet (75 mcg total) by mouth before breakfast.       Start Date: 5/13/2022 End Date: 5/13/2023    LOSARTAN (COZAAR) 25 MG TABLET    Take 25 mg by mouth once daily.       Start Date: 8/22/2022 End Date: --    METOPROLOL TARTRATE (LOPRESSOR) 25 MG TABLET    Take 25 mg by mouth once daily.       Start Date: 9/9/2022  End Date: --    PRAVASTATIN (PRAVACHOL) 40 MG TABLET    Take 40 mg by mouth once daily.       Start Date: 7/14/2022 End Date: --    VITAMIN D (VITAMIN D3) 1000 UNITS TAB    Take 5,000 Units by mouth.       Start  Date: --        End Date: --    ZINC SULFATE (ZINCATE) 50 MG ZINC (220 MG) CAPSULE    Take 50 mg by mouth.       Start Date: --        End Date: --   Discontinued Medications    FLUOXETINE 40 MG CAPSULE    Take 1 capsule (40 mg total) by mouth once daily.       Start Date: 5/13/2022 End Date: 10/27/2022        Follow up in about 6 weeks (around 12/8/2022) for Mood D/O F/up. In addition to their scheduled follow up, the patient has also been instructed to follow up on as needed basis.

## 2022-10-27 NOTE — ASSESSMENT & PLAN NOTE
Current TSH slightly elevated, patient takes levothyroxine 75 mcg no current symptoms of decompensated thyroid disease, continue current treatment plan with recheck of TSH annually.

## 2022-10-27 NOTE — ASSESSMENT & PLAN NOTE
Current A1c 6.0%, no history of diabetes or pre diabetes, no current significant concerns, healthy diet, we will continue to monitor this annual.

## 2022-11-28 ENCOUNTER — TELEPHONE (OUTPATIENT)
Dept: PRIMARY CARE CLINIC | Facility: CLINIC | Age: 72
End: 2022-11-28
Payer: MEDICARE

## 2022-11-28 DIAGNOSIS — E78.5 DYSLIPIDEMIA: Primary | Chronic | ICD-10-CM

## 2022-11-28 RX ORDER — PRAVASTATIN SODIUM 40 MG/1
40 TABLET ORAL DAILY
Qty: 90 TABLET | Refills: 3 | Status: SHIPPED | OUTPATIENT
Start: 2022-11-28 | End: 2023-12-13 | Stop reason: SDUPTHER

## 2022-11-28 NOTE — TELEPHONE ENCOUNTER
----- Message from Marialuisa Rock sent at 11/25/2022  9:17 AM CST -----  Regarding: refill  Type:  RX Refill Request    Who Called: pt  Refill or New Rx:refill  RX Name and Strength:pravastatin (PRAVACHOL) 40 MG tablet  How is the patient currently taking it? (ex. 1XDay):1xday  Is this a 30 day or 90 day RX:90  Preferred Pharmacy with phone number:[facility] in [place]  Local or Mail Order:local  Ordering Provider:gloria peres  Would the patient rather a call back or a response via [facility]?    Best Call Back Number:[phone]  Additional Information: pt called about needing a refill of his meds. Stated that the pharmacy has called all week

## 2022-12-28 NOTE — PROGRESS NOTES
"Subjective:       Patient ID: Steven Xavier is a 72 y.o. male.    Chief Complaint: Abdominal Pain, Low-back Pain, and Follow-up (Mood disorder)    See established patient, presents for recheck after switching from fluoxetine to sertraline.  He does feel better, the medication does seem to be effective.  However, a few nights ago he had chest pain, took two nitroglycerins, chest pain eventually went away.  Notably, he does have six cardiac stents, placed this past year.  Has not had any chest pain since this stents were placed.  Also, having some left-sided abdominal pain, not sure of the cause of that pain although he did have diarrhea for few months a short time ago.  Lastly, having some low back pain as well.    Review of Systems   Constitutional: Negative.  Negative for fatigue and fever.   Respiratory: Negative.  Negative for shortness of breath.    Cardiovascular:  Positive for chest pain.   Gastrointestinal:  Positive for abdominal pain. Negative for change in bowel habit and change in bowel habit.   Integumentary:  Negative.   Neurological:  Negative for weakness.       Objective:     Vitals:    12/29/22 1512   BP: 135/76   Pulse: (!) 56   Resp: 18   SpO2: 98%   Weight: 98.9 kg (218 lb)   Height: 5' 5" (1.651 m)   Body mass index is 36.28 kg/m².     Physical Exam  Constitutional:       Appearance: Normal appearance.   Eyes:      Conjunctiva/sclera: Conjunctivae normal.   Cardiovascular:      Rate and Rhythm: Normal rate and regular rhythm.   Pulmonary:      Effort: Pulmonary effort is normal.      Breath sounds: Normal breath sounds.   Skin:     General: Skin is warm and dry.   Neurological:      Mental Status: He is alert.   Psychiatric:         Mood and Affect: Mood normal.         Behavior: Behavior normal.         Assessment:     Problem List Items Addressed This Visit          Psychiatric    Depressive disorder - Primary (Chronic)       Cardiac/Vascular    Presence of stent in coronary artery (Chronic) "    Relevant Medications    nitroGLYCERIN (NITROSTAT) 0.4 MG SL tablet    Precordial pain    Current Assessment & Plan     Nitroglycerin refilled, his previous nitroglycerin had .  Notably, he was out of his Plavix for some time, he is getting a refill of this medication currently.  Lastly, I will place referral to contact his cardiologist, his next visit is in January, I would like to get him seen before then.         Relevant Medications    nitroGLYCERIN (NITROSTAT) 0.4 MG SL tablet    Other Relevant Orders    Ambulatory referral/consult to Cardiology       Endocrine    Type 2 diabetes mellitus with other specified complication, without long-term current use of insulin (Chronic)    Current Assessment & Plan     Recheck in approximately three months with pre visit A1c and microalbumin.         Relevant Orders    Hemoglobin A1C    Microalbumin/Creatinine Ratio, Urine    Acquired hypothyroidism (Chronic)    Relevant Orders    TSH          Medication List with Changes/Refills   New Medications    NITROGLYCERIN (NITROSTAT) 0.4 MG SL TABLET    Place 1 tablet (0.4 mg total) under the tongue every 5 (five) minutes as needed for Chest pain.   Current Medications    ASPIRIN (ECOTRIN) 81 MG EC TABLET    Take 81 mg by mouth.    CLOPIDOGREL (PLAVIX) 75 MG TABLET    Take 75 mg by mouth once daily.    HYDROCHLOROTHIAZIDE (HYDRODIURIL) 12.5 MG TAB    Take 12.5 mg by mouth once daily.    LEVOTHYROXINE (SYNTHROID) 75 MCG TABLET    Take 1 tablet (75 mcg total) by mouth before breakfast.    LOSARTAN (COZAAR) 25 MG TABLET    Take 25 mg by mouth once daily.    METOPROLOL TARTRATE (LOPRESSOR) 25 MG TABLET    Take 25 mg by mouth once daily.    PRAVASTATIN (PRAVACHOL) 40 MG TABLET    Take 1 tablet (40 mg total) by mouth once daily.    SERTRALINE (ZOLOFT) 50 MG TABLET    Take 1 tablet (50 mg total) by mouth once daily.    VITAMIN D (VITAMIN D3) 1000 UNITS TAB    Take 5,000 Units by mouth.    ZINC SULFATE (ZINCATE) 50 MG ZINC (220 MG)  CAPSULE    Take 50 mg by mouth.     Plan:             Follow up in about 3 months (around 4/5/2023) for Chronic condition F/up, DM F/up.

## 2022-12-29 ENCOUNTER — OFFICE VISIT (OUTPATIENT)
Dept: PRIMARY CARE CLINIC | Facility: CLINIC | Age: 72
End: 2022-12-29
Payer: MEDICARE

## 2022-12-29 VITALS
OXYGEN SATURATION: 98 % | HEART RATE: 56 BPM | DIASTOLIC BLOOD PRESSURE: 76 MMHG | RESPIRATION RATE: 18 BRPM | WEIGHT: 218 LBS | HEIGHT: 65 IN | SYSTOLIC BLOOD PRESSURE: 135 MMHG | BODY MASS INDEX: 36.32 KG/M2

## 2022-12-29 DIAGNOSIS — R07.2 PRECORDIAL PAIN: ICD-10-CM

## 2022-12-29 DIAGNOSIS — F32.A DEPRESSIVE DISORDER: Primary | Chronic | ICD-10-CM

## 2022-12-29 DIAGNOSIS — E11.69 TYPE 2 DIABETES MELLITUS WITH OTHER SPECIFIED COMPLICATION, WITHOUT LONG-TERM CURRENT USE OF INSULIN: Chronic | ICD-10-CM

## 2022-12-29 DIAGNOSIS — E03.9 ACQUIRED HYPOTHYROIDISM: Chronic | ICD-10-CM

## 2022-12-29 DIAGNOSIS — Z95.5 PRESENCE OF STENT IN CORONARY ARTERY: Chronic | ICD-10-CM

## 2022-12-29 PROCEDURE — 3008F BODY MASS INDEX DOCD: CPT | Mod: CPTII,,, | Performed by: GENERAL PRACTICE

## 2022-12-29 PROCEDURE — 99214 PR OFFICE/OUTPT VISIT, EST, LEVL IV, 30-39 MIN: ICD-10-PCS | Mod: ,,, | Performed by: GENERAL PRACTICE

## 2022-12-29 PROCEDURE — 1160F PR REVIEW ALL MEDS BY PRESCRIBER/CLIN PHARMACIST DOCUMENTED: ICD-10-PCS | Mod: CPTII,,, | Performed by: GENERAL PRACTICE

## 2022-12-29 PROCEDURE — 3075F PR MOST RECENT SYSTOLIC BLOOD PRESS GE 130-139MM HG: ICD-10-PCS | Mod: CPTII,,, | Performed by: GENERAL PRACTICE

## 2022-12-29 PROCEDURE — 3075F SYST BP GE 130 - 139MM HG: CPT | Mod: CPTII,,, | Performed by: GENERAL PRACTICE

## 2022-12-29 PROCEDURE — 3078F PR MOST RECENT DIASTOLIC BLOOD PRESSURE < 80 MM HG: ICD-10-PCS | Mod: CPTII,,, | Performed by: GENERAL PRACTICE

## 2022-12-29 PROCEDURE — 1159F MED LIST DOCD IN RCRD: CPT | Mod: CPTII,,, | Performed by: GENERAL PRACTICE

## 2022-12-29 PROCEDURE — 99214 OFFICE O/P EST MOD 30 MIN: CPT | Mod: ,,, | Performed by: GENERAL PRACTICE

## 2022-12-29 PROCEDURE — 1159F PR MEDICATION LIST DOCUMENTED IN MEDICAL RECORD: ICD-10-PCS | Mod: CPTII,,, | Performed by: GENERAL PRACTICE

## 2022-12-29 PROCEDURE — 4010F ACE/ARB THERAPY RXD/TAKEN: CPT | Mod: CPTII,,, | Performed by: GENERAL PRACTICE

## 2022-12-29 PROCEDURE — 3078F DIAST BP <80 MM HG: CPT | Mod: CPTII,,, | Performed by: GENERAL PRACTICE

## 2022-12-29 PROCEDURE — 1160F RVW MEDS BY RX/DR IN RCRD: CPT | Mod: CPTII,,, | Performed by: GENERAL PRACTICE

## 2022-12-29 PROCEDURE — 4010F PR ACE/ARB THEARPY RXD/TAKEN: ICD-10-PCS | Mod: CPTII,,, | Performed by: GENERAL PRACTICE

## 2022-12-29 PROCEDURE — 3008F PR BODY MASS INDEX (BMI) DOCUMENTED: ICD-10-PCS | Mod: CPTII,,, | Performed by: GENERAL PRACTICE

## 2022-12-29 RX ORDER — NITROGLYCERIN 0.4 MG/1
0.4 TABLET SUBLINGUAL EVERY 5 MIN PRN
Qty: 30 TABLET | Refills: 11 | Status: SHIPPED | OUTPATIENT
Start: 2022-12-29 | End: 2023-12-29

## 2022-12-29 NOTE — LETTER
December 29, 2022      [facility]  [address] [place] [address], [address]  [facility] [address]  Phone: [phone]       Patient: Steven Xavier   YOB: 1950  Date of Visit: 12/29/2022    To Whom It May Concern:    Ghassan Xavier  was at [facility] on 12/29/2022. The patient may return to work/school on 01/02/2023 with no restrictions. If you have any questions or concerns, or if I can be of further assistance, please do not hesitate to contact me.    Sincerely,    Madeleine Cabrera MA

## 2022-12-29 NOTE — ASSESSMENT & PLAN NOTE
Nitroglycerin refilled, his previous nitroglycerin had .  Notably, he was out of his Plavix for some time, he is getting a refill of this medication currently.  Lastly, I will place referral to contact his cardiologist, his next visit is in January, I would like to get him seen before then.

## 2023-01-23 ENCOUNTER — PATIENT MESSAGE (OUTPATIENT)
Dept: ADMINISTRATIVE | Facility: HOSPITAL | Age: 73
End: 2023-01-23
Payer: MEDICARE

## 2023-02-15 ENCOUNTER — PATIENT OUTREACH (OUTPATIENT)
Dept: ADMINISTRATIVE | Facility: HOSPITAL | Age: 73
End: 2023-02-15
Payer: MEDICARE

## 2023-02-15 NOTE — PROGRESS NOTES
Population Health Outreach.  Unable to reach pt., Phone not working. Will forward letter regarding overdue Eye exam.  Reached out to Emergency contact Annmarie, states he has phone trouble, number is current. She will reach out to him with my contact info.

## 2023-02-15 NOTE — LETTER
Diabetic Retinal Eye Exam    Diabetes is the #1 cause of blindness in the [place] - early detection before signs or symptoms develop can prevent debilitating blindness.    Once-a-year screening is recommended for all diabetic patients. This exam can prevent and treat diabetes complications in the eye before developing symptoms. This can be done with a special camera is used to take photographs of the back of your eye without having to dilate them, or you can see an eye doctor for a full dilated exam.    Although you are still overdue for this important screening, due to the COVID-19 pandemic, we recommend scheduling it in the near future.

## 2023-02-15 NOTE — LETTER
"           Diabetic Retinal Eye Exam     Diabetes is the #1 cause of blindness in the [place] - early detection before signs or symptoms develop can prevent debilitating blindness.     Once-a-year screening is recommended for all diabetic patients. This exam can prevent and treat diabetes complications in the eye before developing symptoms. This can be done with a special camera is used to take photographs of the back of your eye without having to dilate them, or you can see an eye doctor for a full dilated exam.     Although you are still overdue for this important screening, due to the COVID-19 pandemic, we recommend scheduling it in the near future.                  If you have any questions, please contact Ilir Slaughter" Yancy ,Care Coordinator @ [phone]   "

## 2023-03-09 DIAGNOSIS — I10 PRIMARY HYPERTENSION: Primary | Chronic | ICD-10-CM

## 2023-03-09 RX ORDER — HYDROCHLOROTHIAZIDE 12.5 MG/1
12.5 TABLET ORAL DAILY
Qty: 90 TABLET | Refills: 3 | Status: SHIPPED | OUTPATIENT
Start: 2023-03-09 | End: 2024-03-12 | Stop reason: SDUPTHER

## 2023-03-21 LAB
LEFT EYE DM RETINOPATHY: NEGATIVE
RIGHT EYE DM RETINOPATHY: NEGATIVE

## 2023-03-22 ENCOUNTER — DOCUMENTATION ONLY (OUTPATIENT)
Dept: PRIMARY CARE CLINIC | Facility: CLINIC | Age: 73
End: 2023-03-22
Payer: MEDICARE

## 2023-03-30 ENCOUNTER — CLINICAL SUPPORT (OUTPATIENT)
Dept: PRIMARY CARE CLINIC | Facility: CLINIC | Age: 73
End: 2023-03-30
Payer: MEDICARE

## 2023-03-30 DIAGNOSIS — E11.69 TYPE 2 DIABETES MELLITUS WITH OTHER SPECIFIED COMPLICATION, WITHOUT LONG-TERM CURRENT USE OF INSULIN: Primary | Chronic | ICD-10-CM

## 2023-03-30 DIAGNOSIS — E03.9 ACQUIRED HYPOTHYROIDISM: Chronic | ICD-10-CM

## 2023-03-30 LAB
CREAT UR-MCNC: 143.1 MG/DL (ref 63–166)
EST. AVERAGE GLUCOSE BLD GHB EST-MCNC: 125.5 MG/DL
HBA1C MFR BLD: 6 %
MICROALBUMIN UR-MCNC: 15.8 UG/ML
MICROALBUMIN/CREAT RATIO PNL UR: 11 MG/GM CR (ref 0–30)
TSH SERPL-ACNC: 9.33 UIU/ML (ref 0.35–4.94)

## 2023-03-30 PROCEDURE — 36415 PR COLLECTION VENOUS BLOOD,VENIPUNCTURE: ICD-10-PCS | Mod: ,,, | Performed by: GENERAL PRACTICE

## 2023-03-30 PROCEDURE — 36415 COLL VENOUS BLD VENIPUNCTURE: CPT

## 2023-03-30 PROCEDURE — 83036 HEMOGLOBIN GLYCOSYLATED A1C: CPT | Performed by: GENERAL PRACTICE

## 2023-03-30 PROCEDURE — 82043 UR ALBUMIN QUANTITATIVE: CPT | Performed by: GENERAL PRACTICE

## 2023-03-30 PROCEDURE — 84443 ASSAY THYROID STIM HORMONE: CPT | Performed by: GENERAL PRACTICE

## 2023-03-30 PROCEDURE — 36415 COLL VENOUS BLD VENIPUNCTURE: CPT | Mod: ,,, | Performed by: GENERAL PRACTICE

## 2023-03-30 NOTE — PROGRESS NOTES
Patient here to draw repeat A1C and TSH labs with 21 gauge needle. Patient was drawn in left arm .No complications or issues occurred. Patient expressed no pain.

## 2023-04-05 ENCOUNTER — OFFICE VISIT (OUTPATIENT)
Dept: PRIMARY CARE CLINIC | Facility: CLINIC | Age: 73
End: 2023-04-05
Payer: MEDICARE

## 2023-04-05 VITALS
RESPIRATION RATE: 18 BRPM | SYSTOLIC BLOOD PRESSURE: 113 MMHG | HEIGHT: 65 IN | BODY MASS INDEX: 36.99 KG/M2 | DIASTOLIC BLOOD PRESSURE: 72 MMHG | WEIGHT: 222 LBS | OXYGEN SATURATION: 98 % | HEART RATE: 60 BPM

## 2023-04-05 DIAGNOSIS — I10 PRIMARY HYPERTENSION: Primary | Chronic | ICD-10-CM

## 2023-04-05 DIAGNOSIS — F32.A DEPRESSIVE DISORDER: Chronic | ICD-10-CM

## 2023-04-05 DIAGNOSIS — R73.03 PRE-DIABETES: Chronic | ICD-10-CM

## 2023-04-05 DIAGNOSIS — E66.01 CLASS 2 SEVERE OBESITY DUE TO EXCESS CALORIES WITH SERIOUS COMORBIDITY AND BODY MASS INDEX (BMI) OF 36.0 TO 36.9 IN ADULT: Chronic | ICD-10-CM

## 2023-04-05 DIAGNOSIS — Z12.5 SCREENING FOR PROSTATE CANCER: ICD-10-CM

## 2023-04-05 DIAGNOSIS — Z00.00 WELLNESS EXAMINATION: ICD-10-CM

## 2023-04-05 DIAGNOSIS — D64.9 ANEMIA, UNSPECIFIED TYPE: ICD-10-CM

## 2023-04-05 DIAGNOSIS — E11.69 TYPE 2 DIABETES MELLITUS WITH OTHER SPECIFIED COMPLICATION, WITHOUT LONG-TERM CURRENT USE OF INSULIN: Chronic | ICD-10-CM

## 2023-04-05 DIAGNOSIS — E03.9 ACQUIRED HYPOTHYROIDISM: Chronic | ICD-10-CM

## 2023-04-05 DIAGNOSIS — Z95.5 PRESENCE OF STENT IN CORONARY ARTERY: Chronic | ICD-10-CM

## 2023-04-05 DIAGNOSIS — I77.9 DISORDER OF ARTERIES AND ARTERIOLES, UNSPECIFIED: Chronic | ICD-10-CM

## 2023-04-05 DIAGNOSIS — E78.5 DYSLIPIDEMIA: Chronic | ICD-10-CM

## 2023-04-05 PROBLEM — E66.812 CLASS 2 SEVERE OBESITY DUE TO EXCESS CALORIES WITH SERIOUS COMORBIDITY AND BODY MASS INDEX (BMI) OF 36.0 TO 36.9 IN ADULT: Chronic | Status: ACTIVE | Noted: 2022-10-26

## 2023-04-05 PROCEDURE — 1159F MED LIST DOCD IN RCRD: CPT | Mod: CPTII,,, | Performed by: GENERAL PRACTICE

## 2023-04-05 PROCEDURE — 3066F PR DOCUMENTATION OF TREATMENT FOR NEPHROPATHY: ICD-10-PCS | Mod: CPTII,,, | Performed by: GENERAL PRACTICE

## 2023-04-05 PROCEDURE — 3074F SYST BP LT 130 MM HG: CPT | Mod: CPTII,,, | Performed by: GENERAL PRACTICE

## 2023-04-05 PROCEDURE — 1160F PR REVIEW ALL MEDS BY PRESCRIBER/CLIN PHARMACIST DOCUMENTED: ICD-10-PCS | Mod: CPTII,,, | Performed by: GENERAL PRACTICE

## 2023-04-05 PROCEDURE — 3008F PR BODY MASS INDEX (BMI) DOCUMENTED: ICD-10-PCS | Mod: CPTII,,, | Performed by: GENERAL PRACTICE

## 2023-04-05 PROCEDURE — 4010F ACE/ARB THERAPY RXD/TAKEN: CPT | Mod: CPTII,,, | Performed by: GENERAL PRACTICE

## 2023-04-05 PROCEDURE — 3078F DIAST BP <80 MM HG: CPT | Mod: CPTII,,, | Performed by: GENERAL PRACTICE

## 2023-04-05 PROCEDURE — 3074F PR MOST RECENT SYSTOLIC BLOOD PRESSURE < 130 MM HG: ICD-10-PCS | Mod: CPTII,,, | Performed by: GENERAL PRACTICE

## 2023-04-05 PROCEDURE — 99214 PR OFFICE/OUTPT VISIT, EST, LEVL IV, 30-39 MIN: ICD-10-PCS | Mod: ,,, | Performed by: GENERAL PRACTICE

## 2023-04-05 PROCEDURE — 4010F PR ACE/ARB THEARPY RXD/TAKEN: ICD-10-PCS | Mod: CPTII,,, | Performed by: GENERAL PRACTICE

## 2023-04-05 PROCEDURE — 3078F PR MOST RECENT DIASTOLIC BLOOD PRESSURE < 80 MM HG: ICD-10-PCS | Mod: CPTII,,, | Performed by: GENERAL PRACTICE

## 2023-04-05 PROCEDURE — 1159F PR MEDICATION LIST DOCUMENTED IN MEDICAL RECORD: ICD-10-PCS | Mod: CPTII,,, | Performed by: GENERAL PRACTICE

## 2023-04-05 PROCEDURE — 1160F RVW MEDS BY RX/DR IN RCRD: CPT | Mod: CPTII,,, | Performed by: GENERAL PRACTICE

## 2023-04-05 PROCEDURE — 3061F NEG MICROALBUMINURIA REV: CPT | Mod: CPTII,,, | Performed by: GENERAL PRACTICE

## 2023-04-05 PROCEDURE — 3066F NEPHROPATHY DOC TX: CPT | Mod: CPTII,,, | Performed by: GENERAL PRACTICE

## 2023-04-05 PROCEDURE — 99214 OFFICE O/P EST MOD 30 MIN: CPT | Mod: ,,, | Performed by: GENERAL PRACTICE

## 2023-04-05 PROCEDURE — 3008F BODY MASS INDEX DOCD: CPT | Mod: CPTII,,, | Performed by: GENERAL PRACTICE

## 2023-04-05 PROCEDURE — 3061F PR NEG MICROALBUMINURIA RESULT DOCUMENTED/REVIEW: ICD-10-PCS | Mod: CPTII,,, | Performed by: GENERAL PRACTICE

## 2023-04-05 RX ORDER — SILDENAFIL CITRATE 50 MG/1
TABLET, FILM COATED ORAL
COMMUNITY
Start: 2023-03-14 | End: 2023-11-01 | Stop reason: SDUPTHER

## 2023-04-05 RX ORDER — LEVOTHYROXINE SODIUM 100 UG/1
100 TABLET ORAL
Qty: 90 TABLET | Refills: 3 | Status: SHIPPED | OUTPATIENT
Start: 2023-04-05 | End: 2023-04-28 | Stop reason: SDUPTHER

## 2023-04-05 NOTE — ASSESSMENT & PLAN NOTE
Patient prescribed pravastatin 40 mg daily.    Component Ref Range & Units 5 mo ago 1 yr ago   Cholesterol Total <=200 mg/dL 212 High   248 High  R    HDL Cholesterol 35 - 60 mg/dL 47  44    Triglyceride 34 - 140 mg/dL 138  150 High     Cholesterol/HDL Ratio 0 - 5 5  6 High     Very Low Density Lipoprotein  28  30    LDL Cholesterol 50.00 - 140.00 mg/dL 137.00  174.00 High       Most recent cholesterol/lipid blood testing shows adequate control, continue current treatment plan, including prescription medications if prescribed, and/or diet high in fiber, low in saturated fats as discussed during clinic visit.

## 2023-04-05 NOTE — ASSESSMENT & PLAN NOTE
Prescribed losartan 25 mg daily, metoprolol 25 mg daily, hydrochlorothiazide 12.5 mg daily.  Blood pressures appear to be adequately controlled with current treatment plan, including prescription blood pressure medication. Continue medical management and continue home blood pressure checks.  Blood pressure should be checked as discussed during medical visits, and average blood pressure should be no greater than 130/80.

## 2023-04-05 NOTE — PROGRESS NOTES
"Subjective:       Patient ID: Steven Xavier is a 72 y.o. male.    Chief Complaint: Follow-up    Patient presents to the clinic today for chronic condition follow-up.  Doing well, no specific complaints, tolerating all medications, reporting no significant side effects or problems.    Last wellness exam was 10/27/2022.      Review of Systems   Constitutional: Negative.  Negative for fatigue and fever.   HENT: Negative.  Negative for sore throat and trouble swallowing.    Eyes: Negative.  Negative for redness and visual disturbance.   Respiratory: Negative.  Negative for chest tightness and shortness of breath.    Cardiovascular: Negative.  Negative for chest pain.   Gastrointestinal: Negative.  Negative for abdominal pain, blood in stool and nausea.   Endocrine: Negative.  Negative for cold intolerance, heat intolerance and polyuria.   Genitourinary: Negative.    Musculoskeletal: Negative.  Negative for arthralgias, gait problem and joint swelling.   Integumentary:  Negative for rash. Negative.   Neurological: Negative.  Negative for dizziness, weakness and headaches.   Psychiatric/Behavioral: Negative.  Negative for agitation and dysphoric mood.        Objective:     Vitals:    04/05/23 1445   BP: 113/72   Pulse: 60   Resp: 18   SpO2: 98%   Weight: 100.7 kg (222 lb)   Height: 5' 5" (1.651 m)   Body mass index is 36.94 kg/m².     Physical Exam  Constitutional:       Appearance: Normal appearance.   Eyes:      Conjunctiva/sclera: Conjunctivae normal.   Cardiovascular:      Rate and Rhythm: Normal rate and regular rhythm.   Pulmonary:      Effort: Pulmonary effort is normal.      Breath sounds: Normal breath sounds.   Skin:     General: Skin is warm and dry.   Neurological:      Mental Status: He is alert.   Psychiatric:         Mood and Affect: Mood normal.         Behavior: Behavior normal.         Lab Results   Component Value Date     10/24/2022    K 4.5 10/24/2022    CO2 27 10/24/2022    BUN 16.2 10/24/2022 "    CREATININE 1.10 10/24/2022    CALCIUM 9.8 10/24/2022    ALBUMIN 4.2 10/24/2022    BILITOT 0.4 10/24/2022    ALKPHOS 93 10/24/2022    AST 27 10/24/2022    ALT 26 10/24/2022    EGFRNONAA >60 10/14/2021     Lab Results   Component Value Date    WBC 8.1 10/24/2022    RBC 4.97 10/24/2022    HGB 15.1 10/24/2022    HCT 45.7 10/24/2022    MCV 92.0 10/24/2022    RDW 13.9 10/24/2022     10/24/2022      Lab Results   Component Value Date    CHOL 212 (H) 10/24/2022    TRIG 138 10/24/2022    HDL 47 10/24/2022    .00 10/24/2022    TOTALCHOLEST 5 10/24/2022     Lab Results   Component Value Date    TSH 9.330 (H) 03/30/2023     Lab Results   Component Value Date    HGBA1C 6.0 03/30/2023        Lab Results   Component Value Date    PSA 1.19 10/24/2022         Assessment:     Problem List Items Addressed This Visit          Psychiatric    Depressive disorder (Chronic)    Current Assessment & Plan     Prescribe sertraline 50 mg daily.  Patient reports stability of moods, and effectiveness of medications, including no agitation, significant somnolence, or significant bouts of anxiety or depression.  Patient is not having any sleep disturbance.  Current treatment plan will continue, with plans to discuss any significant changes in patient's status if necessary if anything changes in the future.              Cardiac/Vascular    Disorder of arteries and arterioles, unspecified (Chronic)    Current Assessment & Plan     Patient does have atherosclerosis, sees Dr. Brown, regular follow-up, condition appears to be stable.           Primary hypertension - Primary (Chronic)    Current Assessment & Plan     Prescribed losartan 25 mg daily, metoprolol 25 mg daily, hydrochlorothiazide 12.5 mg daily.  Blood pressures appear to be adequately controlled with current treatment plan, including prescription blood pressure medication. Continue medical management and continue home blood pressure checks.  Blood pressure should be checked  as discussed during medical visits, and average blood pressure should be no greater than 130/80.           Dyslipidemia (Chronic)    Current Assessment & Plan     Patient prescribed pravastatin 40 mg daily.    Component Ref Range & Units 5 mo ago 1 yr ago   Cholesterol Total <=200 mg/dL 212 High   248 High  R    HDL Cholesterol 35 - 60 mg/dL 47  44    Triglyceride 34 - 140 mg/dL 138  150 High     Cholesterol/HDL Ratio 0 - 5 5  6 High     Very Low Density Lipoprotein  28  30    LDL Cholesterol 50.00 - 140.00 mg/dL 137.00  174.00 High       Most recent cholesterol/lipid blood testing shows adequate control, continue current treatment plan, including prescription medications if prescribed, and/or diet high in fiber, low in saturated fats as discussed during clinic visit.         Relevant Orders    Lipid Panel    Presence of stent in coronary artery (Chronic)    Current Assessment & Plan     No current or recent symptoms of chest pain or dyspnea, continue follow-up with Cardiology.              Oncology    Anemia    Current Assessment & Plan     Component Ref Range & Units 5 mo ago 1 yr ago   WBC 4.5 - 11.5 x10(3)/mcL 8.1  7.6    RBC 4.70 - 6.10 x10(6)/mcL 4.97  4.41 Low     Hgb 14.0 - 18.0 gm/dL 15.1  13.7 Low     Hct 42.0 - 52.0 % 45.7  41.5 Low     MCV 80.0 - 94.0 fL 92.0  94.1 High     MCH 27.0 - 31.0 pg 30.4  31.1 High     MCHC 33.0 - 36.0 mg/dL 33.0  33.0 R    RDW 11.5 - 17.0 % 13.9  13.4    Platelet 130 - 400 x10(3)/mcL 256  317    MPV 7.4 - 10.4 fL 10.4  10.7 R    IG# 0 - 0.04 x10(3)/mcL 0.03     IG% % 0.4     NRBC% % 0.0       Last CBC 5 months ago was normal, this will be resolved as a chronic condition.         Relevant Orders    CBC Auto Differential       Endocrine    Class 2 severe obesity due to excess calories with serious comorbidity and body mass index (BMI) of 36.0 to 36.9 in adult (Chronic)    Current Assessment & Plan     Weight loss, healthy dietary choices, increased activity/exercise are  "recommended.  To lose weight, it is generally recommended to restrict calories to approximately 1500 calories per day to lose 1 lb per week, and approximately 1200 calories per day to lose up to 2 lb per week.  Generally, this is a healthy amount of weight to lose, and an appropriate amount of time to lose this amount of weight.  Adding exercise will assist in losing weight, particularly aerobic exercise such as walking.  However, resistance training, or lifting weights" over time may assistant weight loss by increasing basal metabolic rate, or the rate at which your body burns calories during periods of inactivity.    Keep track of BMI (body mass index), below 25 is considered normal, over 25 but below 30 is considered overweight, anything over 30 is considered moderately obese, over 35 severely obese, and over 40 is characterized as morbidly obese.           Acquired hypothyroidism (Chronic)    Current Assessment & Plan     Component Ref Range & Units 5 d ago 5 mo ago 1 yr ago   Thyroid Stimulating Hormone 0.350 - 4.940 uIU/mL 9.330 High   6.0245 High  R  1.7935 R      TSH does appear to be significantly elevated, patient is taking levothyroxine 75 mcg daily.  Will increase dose to 100 mcg daily, and recheck TSH in six months at next wellness exam.         Relevant Medications    levothyroxine (SYNTHROID) 100 MCG tablet    Other Relevant Orders    TSH    Pre-diabetes (Chronic)    Current Assessment & Plan     Patient does appear to be prediabetic, A1c 6.0% on no medication.           RESOLVED: Type 2 diabetes mellitus with other specified complication, without long-term current use of insulin (Chronic)    Current Assessment & Plan     Component Ref Range & Units 5 d ago 5 mo ago 1 yr ago   Hemoglobin A1c <=7.0 % 6.0  6.0  5.6 R    Estimated Average Glucose mg/dL 125.5  125.5  114.0      Most recent A1c 6.0%.  Currently not on any diabetic medication, lifestyle modification alone, diabetes appears to be " well-controlled.         Relevant Orders    Hemoglobin A1C     Other Visit Diagnoses       Wellness examination        This diagnosis does not apply to this visit, wellness exam with pre visit labs will be scheduled/ordered for future visit.    Relevant Orders    Hemoglobin A1C    TSH    Lipid Panel    Comprehensive Metabolic Panel    CBC Auto Differential    Screening for prostate cancer        Patient does not wish to do any prostate cancer screening.               Medication List with Changes/Refills   New Medications    LEVOTHYROXINE (SYNTHROID) 100 MCG TABLET    Take 1 tablet (100 mcg total) by mouth before breakfast.   Current Medications    ASPIRIN (ECOTRIN) 81 MG EC TABLET    Take 81 mg by mouth.    CLOPIDOGREL (PLAVIX) 75 MG TABLET    Take 75 mg by mouth once daily.    HYDROCHLOROTHIAZIDE (HYDRODIURIL) 12.5 MG TAB    Take 1 tablet (12.5 mg total) by mouth once daily.    LOSARTAN (COZAAR) 25 MG TABLET    Take 25 mg by mouth once daily.    METOPROLOL TARTRATE (LOPRESSOR) 25 MG TABLET    Take 25 mg by mouth once daily.    MV-MN/IRON/FOLIC ACID/HERB 190 (VITAMIN D3 COMPLETE ORAL)    Take by mouth.    NITROGLYCERIN (NITROSTAT) 0.4 MG SL TABLET    Place 1 tablet (0.4 mg total) under the tongue every 5 (five) minutes as needed for Chest pain.    PRAVASTATIN (PRAVACHOL) 40 MG TABLET    Take 1 tablet (40 mg total) by mouth once daily.    SERTRALINE (ZOLOFT) 50 MG TABLET    Take 1 tablet (50 mg total) by mouth once daily.    VIAGRA 50 MG TABLET    SMARTSI.5 Tablet(s) By Mouth As Directed    VITAMIN D (VITAMIN D3) 1000 UNITS TAB    Take 5,000 Units by mouth.    ZINC SULFATE (ZINCATE) 50 MG ZINC (220 MG) CAPSULE    Take 50 mg by mouth.   Discontinued Medications    LEVOTHYROXINE (SYNTHROID) 75 MCG TABLET    Take 1 tablet (75 mcg total) by mouth before breakfast.     Plan:             Follow up in about 30 weeks (around 2023) for Medicare Wellness.

## 2023-04-05 NOTE — ASSESSMENT & PLAN NOTE
Prescribe sertraline 50 mg daily.  Patient reports stability of moods, and effectiveness of medications, including no agitation, significant somnolence, or significant bouts of anxiety or depression.  Patient is not having any sleep disturbance.  Current treatment plan will continue, with plans to discuss any significant changes in patient's status if necessary if anything changes in the future.

## 2023-04-05 NOTE — ASSESSMENT & PLAN NOTE
Component Ref Range & Units 5 d ago 5 mo ago 1 yr ago   Hemoglobin A1c <=7.0 % 6.0  6.0  5.6 R    Estimated Average Glucose mg/dL 125.5  125.5  114.0      Most recent A1c 6.0%.  Currently not on any diabetic medication, lifestyle modification alone, diabetes appears to be well-controlled.

## 2023-04-05 NOTE — ASSESSMENT & PLAN NOTE
Component Ref Range & Units 5 d ago 5 mo ago 1 yr ago   Thyroid Stimulating Hormone 0.350 - 4.940 uIU/mL 9.330 High   6.0245 High  R  1.7935 R      TSH does appear to be significantly elevated, patient is taking levothyroxine 75 mcg daily.  Will increase dose to 100 mcg daily, and recheck TSH in six months at next wellness exam.

## 2023-04-05 NOTE — ASSESSMENT & PLAN NOTE
Component Ref Range & Units 5 mo ago 1 yr ago   WBC 4.5 - 11.5 x10(3)/mcL 8.1  7.6    RBC 4.70 - 6.10 x10(6)/mcL 4.97  4.41 Low     Hgb 14.0 - 18.0 gm/dL 15.1  13.7 Low     Hct 42.0 - 52.0 % 45.7  41.5 Low     MCV 80.0 - 94.0 fL 92.0  94.1 High     MCH 27.0 - 31.0 pg 30.4  31.1 High     MCHC 33.0 - 36.0 mg/dL 33.0  33.0 R    RDW 11.5 - 17.0 % 13.9  13.4    Platelet 130 - 400 x10(3)/mcL 256  317    MPV 7.4 - 10.4 fL 10.4  10.7 R    IG# 0 - 0.04 x10(3)/mcL 0.03     IG% % 0.4     NRBC% % 0.0       Last CBC 5 months ago was normal, this will be resolved as a chronic condition.

## 2023-04-05 NOTE — ASSESSMENT & PLAN NOTE
Patient does have atherosclerosis, sees Dr. Brown, regular follow-up, condition appears to be stable.

## 2023-04-28 ENCOUNTER — TELEPHONE (OUTPATIENT)
Dept: PRIMARY CARE CLINIC | Facility: CLINIC | Age: 73
End: 2023-04-28
Payer: MEDICARE

## 2023-04-28 DIAGNOSIS — E03.9 ACQUIRED HYPOTHYROIDISM: Chronic | ICD-10-CM

## 2023-04-28 RX ORDER — LEVOTHYROXINE SODIUM 100 UG/1
100 TABLET ORAL
Qty: 90 TABLET | Refills: 3 | Status: SHIPPED | OUTPATIENT
Start: 2023-04-28 | End: 2023-05-03

## 2023-04-28 NOTE — TELEPHONE ENCOUNTER
----- Message from Annmarie Wakefield sent at 4/28/2023  8:52 AM CDT -----  Regarding: call back  .Type:  Needs Medical Advice    Who Called: pt  Symptoms (please be specific):    How long has patient had these symptoms:    Pharmacy name and phone #:    Would the patient rather a call back or a response via [facility]? Call back  Best Call Back Number: [phone]  Additional Information: pt is requesting a call back about meds levothyroxine (SYNTHROID) 100 MCG tablet

## 2023-05-01 ENCOUNTER — PATIENT MESSAGE (OUTPATIENT)
Dept: ADMINISTRATIVE | Facility: HOSPITAL | Age: 73
End: 2023-05-01
Payer: MEDICARE

## 2023-05-03 ENCOUNTER — TELEPHONE (OUTPATIENT)
Dept: PRIMARY CARE CLINIC | Facility: CLINIC | Age: 73
End: 2023-05-03
Payer: MEDICARE

## 2023-05-03 DIAGNOSIS — E03.9 ACQUIRED HYPOTHYROIDISM: Primary | Chronic | ICD-10-CM

## 2023-05-03 RX ORDER — LEVOTHYROXINE SODIUM 88 UG/1
88 TABLET ORAL
Qty: 90 TABLET | Refills: 3 | Status: SHIPPED | OUTPATIENT
Start: 2023-05-03 | End: 2024-05-02

## 2023-05-03 NOTE — TELEPHONE ENCOUNTER
Pt called and stated at last office visit it was discussed that he will start taking levothyroxine 88 mcg. Pt stated the pharmacy is still filling the 100 mcg. Please advise

## 2023-06-26 ENCOUNTER — TELEPHONE (OUTPATIENT)
Dept: PRIMARY CARE CLINIC | Facility: CLINIC | Age: 73
End: 2023-06-26
Payer: MEDICARE

## 2023-06-26 NOTE — TELEPHONE ENCOUNTER
----- Message from J Luis Harris sent at 6/26/2023 10:08 AM CDT -----  Regarding: call back  .Type:  Needs Medical Advice    Who Called: grayson  Would the patient rather a call back or a response via [facility]? marjan  Best Call Back Number: [phone]  Additional Information: Pt states he was diagnosed with pneumonia and was prescribed duxycycline hyclyte . He wants a second opinion from the doctor to see if that something he should take or recommend other medications pls call back

## 2023-06-26 NOTE — TELEPHONE ENCOUNTER
Spoke to patient regarding his message, he stated that he was seen at [facility]  yesterday, he was diagnosis with Pneumonia and was wanting to know if the treatment plan was approved by provider. I instructed him to monitor his oxygen and heart rate, continue medication. If his symptoms do not improve, contact us and we would evaluate him

## 2023-07-25 ENCOUNTER — PATIENT MESSAGE (OUTPATIENT)
Dept: ADMINISTRATIVE | Facility: HOSPITAL | Age: 73
End: 2023-07-25
Payer: MEDICARE

## 2023-08-07 ENCOUNTER — OFFICE VISIT (OUTPATIENT)
Dept: PRIMARY CARE CLINIC | Facility: CLINIC | Age: 73
End: 2023-08-07
Payer: MEDICARE

## 2023-08-07 ENCOUNTER — TELEPHONE (OUTPATIENT)
Dept: PRIMARY CARE CLINIC | Facility: CLINIC | Age: 73
End: 2023-08-07

## 2023-08-07 VITALS
RESPIRATION RATE: 18 BRPM | HEIGHT: 65 IN | SYSTOLIC BLOOD PRESSURE: 128 MMHG | BODY MASS INDEX: 36.49 KG/M2 | WEIGHT: 219 LBS | DIASTOLIC BLOOD PRESSURE: 49 MMHG | OXYGEN SATURATION: 96 % | HEART RATE: 58 BPM

## 2023-08-07 DIAGNOSIS — R06.09 DYSPNEA ON EXERTION: Chronic | ICD-10-CM

## 2023-08-07 PROCEDURE — 1160F RVW MEDS BY RX/DR IN RCRD: CPT | Mod: CPTII,,, | Performed by: GENERAL PRACTICE

## 2023-08-07 PROCEDURE — 3066F PR DOCUMENTATION OF TREATMENT FOR NEPHROPATHY: ICD-10-PCS | Mod: CPTII,,, | Performed by: GENERAL PRACTICE

## 2023-08-07 PROCEDURE — 99213 PR OFFICE/OUTPT VISIT, EST, LEVL III, 20-29 MIN: ICD-10-PCS | Mod: ,,, | Performed by: GENERAL PRACTICE

## 2023-08-07 PROCEDURE — 3078F DIAST BP <80 MM HG: CPT | Mod: CPTII,,, | Performed by: GENERAL PRACTICE

## 2023-08-07 PROCEDURE — 1160F PR REVIEW ALL MEDS BY PRESCRIBER/CLIN PHARMACIST DOCUMENTED: ICD-10-PCS | Mod: CPTII,,, | Performed by: GENERAL PRACTICE

## 2023-08-07 PROCEDURE — 3074F SYST BP LT 130 MM HG: CPT | Mod: CPTII,,, | Performed by: GENERAL PRACTICE

## 2023-08-07 PROCEDURE — 3044F HG A1C LEVEL LT 7.0%: CPT | Mod: CPTII,,, | Performed by: GENERAL PRACTICE

## 2023-08-07 PROCEDURE — 3061F PR NEG MICROALBUMINURIA RESULT DOCUMENTED/REVIEW: ICD-10-PCS | Mod: CPTII,,, | Performed by: GENERAL PRACTICE

## 2023-08-07 PROCEDURE — 4010F PR ACE/ARB THEARPY RXD/TAKEN: ICD-10-PCS | Mod: CPTII,,, | Performed by: GENERAL PRACTICE

## 2023-08-07 PROCEDURE — 99213 OFFICE O/P EST LOW 20 MIN: CPT | Mod: ,,, | Performed by: GENERAL PRACTICE

## 2023-08-07 PROCEDURE — 1159F PR MEDICATION LIST DOCUMENTED IN MEDICAL RECORD: ICD-10-PCS | Mod: CPTII,,, | Performed by: GENERAL PRACTICE

## 2023-08-07 PROCEDURE — 3066F NEPHROPATHY DOC TX: CPT | Mod: CPTII,,, | Performed by: GENERAL PRACTICE

## 2023-08-07 PROCEDURE — 3074F PR MOST RECENT SYSTOLIC BLOOD PRESSURE < 130 MM HG: ICD-10-PCS | Mod: CPTII,,, | Performed by: GENERAL PRACTICE

## 2023-08-07 PROCEDURE — 3078F PR MOST RECENT DIASTOLIC BLOOD PRESSURE < 80 MM HG: ICD-10-PCS | Mod: CPTII,,, | Performed by: GENERAL PRACTICE

## 2023-08-07 PROCEDURE — 1159F MED LIST DOCD IN RCRD: CPT | Mod: CPTII,,, | Performed by: GENERAL PRACTICE

## 2023-08-07 PROCEDURE — 4010F ACE/ARB THERAPY RXD/TAKEN: CPT | Mod: CPTII,,, | Performed by: GENERAL PRACTICE

## 2023-08-07 PROCEDURE — 3061F NEG MICROALBUMINURIA REV: CPT | Mod: CPTII,,, | Performed by: GENERAL PRACTICE

## 2023-08-07 PROCEDURE — 3044F PR MOST RECENT HEMOGLOBIN A1C LEVEL <7.0%: ICD-10-PCS | Mod: CPTII,,, | Performed by: GENERAL PRACTICE

## 2023-08-07 RX ORDER — ZINC SULFATE 50(220)MG
1 CAPSULE ORAL EVERY MORNING
COMMUNITY
End: 2023-08-07 | Stop reason: SDUPTHER

## 2023-08-07 RX ORDER — CHOLECALCIFEROL (VITAMIN D3) 25 MCG
5000 TABLET ORAL
COMMUNITY
End: 2023-08-07 | Stop reason: SDUPTHER

## 2023-08-07 RX ORDER — ALBUTEROL SULFATE 90 UG/1
AEROSOL, METERED RESPIRATORY (INHALATION)
COMMUNITY
Start: 2023-07-14

## 2023-08-07 RX ORDER — DOXYCYCLINE HYCLATE 100 MG
TABLET ORAL
COMMUNITY
Start: 2023-07-03 | End: 2023-08-07

## 2023-08-07 NOTE — TELEPHONE ENCOUNTER
----- Message from Arabella Lopez sent at 8/7/2023  9:21 AM CDT -----  Regarding: FW: call back    ----- Message -----  From: Annmarie Wakefield  Sent: 8/7/2023   9:16 AM CDT  To: [facility] Staff  Subject: call back                                        .Type:  Needs Medical Advice    Who Called: pt  Symptoms (please be specific):    How long has patient had these symptoms:    Pharmacy name and phone #:    Would the patient rather a call back or a response via [facility]? Call back  Best Call Back Number: [phone]  Additional Information: pt is requesting a call ABL take a message per clinic

## 2023-08-07 NOTE — ASSESSMENT & PLAN NOTE
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  However, I do have some concerns about his blood pressure, today's blood pressure is quite low, diastolic at 49.  My initial thought was to get him off the beta-blocker, but instead I will stop his losartan having come back in 2-3 weeks for recheck.  Concerning the possibility for him having long COVID, we certainly could consider a pulmonology referral but I am not sure if that would change anything clinically at this time.  Notably, he still works, he drives a delivery truck, he is in the he quite a bit, and that certainly could be affecting him.  If he does not feel better at his next visit, we will proceed with more evaluation and testing.

## 2023-08-07 NOTE — PROGRESS NOTES
"Subjective:       Patient ID: Steven Xavier is a 73 y.o. male.    Chief Complaint: Cough and Shortness of Breath    Established patient, presents to the clinic complaining of dyspnea.  Diagnosed and treated for left lower lobe pneumonia over a month ago.  He states that he has the onset of fatigue, some dyspnea on exertion at times.  He does remember that this began after he had COVID a couple of years ago, feels like he never completely recovered.  Notably, he does have multiple medical issues, including history of coronary artery disease with NSTEMI, hypertension, anemia and sleep apnea.  It was noted today his blood pressure was quite low, 128/49.  He does take multiple medications including a beta-blocker, Toprol 25 mg and losartan 25 mg daily.  He was diagnosed over a month ago with pneumonia, treated, seem to recover completely.  Not see a pulmonologist after his bout of COVID.      Review of Systems   Constitutional:  Positive for fatigue. Negative for fever.   HENT:  Negative for sore throat.    Respiratory:  Positive for shortness of breath. Negative for cough and chest tightness.    Cardiovascular: Negative.    Gastrointestinal: Negative.    Integumentary:  Negative.         Objective:   Visit Vitals  BP (!) 128/49   Pulse (!) 58   Resp 18   Ht 5' 5" (1.651 m)   Wt 99.3 kg (219 lb)   SpO2 96%   BMI 36.44 kg/m²        Physical Exam  Constitutional:       Appearance: Normal appearance.   Eyes:      Conjunctiva/sclera: Conjunctivae normal.   Cardiovascular:      Rate and Rhythm: Normal rate and regular rhythm.   Pulmonary:      Effort: Pulmonary effort is normal.      Breath sounds: Normal breath sounds.      Comments: Lung sounds are completely normal, bilateral, full, equal, without rales, rhonchi, or wheezes.  Skin:     General: Skin is warm and dry.   Neurological:      Mental Status: He is alert.   Psychiatric:         Mood and Affect: Mood normal.         Behavior: Behavior normal.           Lab Results "   Component Value Date     10/24/2022    K 4.5 10/24/2022     02/26/2022    CO2 27 10/24/2022    BUN 16.2 10/24/2022    CREATININE 1.10 10/24/2022    CALCIUM 9.8 10/24/2022    ALBUMIN 4.2 10/24/2022    BILITOT 0.4 10/24/2022    ALKPHOS 93 10/24/2022    AST 27 10/24/2022    ALT 26 10/24/2022    ANIONGAP 9 02/26/2022    ESTGFRAFRICA 107 02/26/2022    EGFRNORACEVR >60 10/24/2022     Lab Results   Component Value Date    WBC 8.1 10/24/2022    WBC 8 10/24/2022    RBC 4.97 10/24/2022    HGB 15.1 10/24/2022    HCT 45.7 10/24/2022    MCV 92.0 10/24/2022    RDW 13.9 10/24/2022     10/24/2022      Lab Results   Component Value Date    CHOL 212 (H) 10/24/2022    TRIG 138 10/24/2022    HDL 47 10/24/2022    .00 10/24/2022    TOTALCHOLEST 5 10/24/2022     Lab Results   Component Value Date    TSH 9.330 (H) 03/30/2023     Lab Results   Component Value Date    HGBA1C 6.0 03/30/2023        Lab Results   Component Value Date    PSA 1.19 10/24/2022         Assessment:     Problem List Items Addressed This Visit          Cardiac/Vascular    Dyspnea on exertion (Chronic)    Current Assessment & Plan     It 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  However, I do have some concerns about his blood pressure, today's blood pressure is quite low, diastolic at 49.  My initial thought was to get him off the beta-blocker, but instead I will stop his losartan having come back in 2-3 weeks for recheck.  Concerning the possibility for him having long COVID, we certainly could consider a pulmonology referral but I am not sure if that would change anything clinically at this time.  Notably, he still works, he drives a delivery truck, he is in the he quite a bit, and that certainly could be affecting him.  If he does not feel better at his next visit, we will proceed with more evaluation and testing.               Current Outpatient Medications   Medication Instructions    albuterol (PROVENTIL/VENTOLIN  HFA) 90 mcg/actuation inhaler Inhalation    aspirin (ECOTRIN) 81 mg, Oral    clopidogreL (PLAVIX) 75 mg, Oral, Daily    hydroCHLOROthiazide (HYDRODIURIL) 12.5 mg, Oral, Daily    levothyroxine (SYNTHROID) 88 mcg, Oral, Before breakfast    metoprolol tartrate (LOPRESSOR) 25 mg, Oral, Daily    mv-mn/iron/folic acid/herb 190 (VITAMIN D3 COMPLETE ORAL) Oral    nitroGLYCERIN (NITROSTAT) 0.4 mg, Sublingual, Every 5 min PRN    pravastatin (PRAVACHOL) 40 mg, Oral, Daily    sertraline (ZOLOFT) 50 mg, Oral, Daily    VIAGRA 50 mg tablet SMARTSI.5 Tablet(s) By Mouth As Directed    vitamin D (VITAMIN D3) 5,000 Units, Oral    zinc sulfate (ZINCATE) 50 mg, Oral     Plan:             Follow up in about 23 days (around 2023).

## 2023-08-27 NOTE — ASSESSMENT & PLAN NOTE
Discontinued losartan 25 mg daily at last visit because of the possibility of low blood pressure causing some MIJARES.  Blood pressures do look better, not as low, seems to have more energy, we will continue this treatment plan.

## 2023-08-27 NOTE — PROGRESS NOTES
"Subjective:       Patient ID: Steven Xavier is a 73 y.o. male.    Chief Complaint: Shortness of Breath (Improved from LOV and Left shoulder pain has also improved some)    Established patient, returns for recheck after being seen several weeks ago with dyspnea on exertion.  He did have a significant case of COVID a while back, seemed to think that he has been short of breath since then.  However, at his last visit his diastolic blood pressure was 49, and for that reason we stopped his losartan 25 mg.    He does feel better, has more energy, still gets short of breath on occasion.  His left shoulder, which had been hurting for a while is also improved, does have an upcoming visit in a proximally two months.      Review of Systems   Constitutional: Negative.  Negative for fatigue and fever.   HENT: Negative.  Negative for sore throat and trouble swallowing.    Eyes: Negative.  Negative for redness and visual disturbance.   Respiratory: Negative.  Negative for chest tightness and shortness of breath.    Cardiovascular: Negative.  Negative for chest pain.   Gastrointestinal: Negative.  Negative for abdominal pain, blood in stool and nausea.   Endocrine: Negative.  Negative for cold intolerance, heat intolerance and polyuria.   Genitourinary: Negative.    Musculoskeletal: Negative.  Negative for arthralgias, gait problem and joint swelling.   Integumentary:  Negative for rash. Negative.   Neurological: Negative.  Negative for dizziness, weakness and headaches.   Psychiatric/Behavioral: Negative.  Negative for agitation and dysphoric mood.          Objective:   Visit Vitals  BP (!) 118/57   Pulse 62   Resp 18   Ht 5' 5" (1.651 m)   Wt 98.9 kg (218 lb)   SpO2 98%   BMI 36.28 kg/m²        Physical Exam  Constitutional:       Appearance: He is obese.   HENT:      Head: Normocephalic.   Eyes:      Conjunctiva/sclera: Conjunctivae normal.   Cardiovascular:      Rate and Rhythm: Normal rate and regular rhythm.   Pulmonary:      " Effort: Pulmonary effort is normal.      Breath sounds: Normal breath sounds.   Abdominal:      Palpations: Abdomen is soft.   Musculoskeletal:         General: Normal range of motion.   Skin:     General: Skin is warm and dry.   Neurological:      General: No focal deficit present.      Mental Status: He is alert. Mental status is at baseline.   Psychiatric:         Mood and Affect: Mood normal.         Behavior: Behavior normal.         Thought Content: Thought content normal.            Assessment:     Problem List Items Addressed This Visit          Cardiac/Vascular    Primary hypertension (Chronic)    Overview     Prescribed Lopressor 25 mg daily.         Current Assessment & Plan     Discontinued losartan 25 mg daily at last visit because of the possibility of low blood pressure causing some MIJARES.  Blood pressures do look better, not as low, seems to have more energy, we will continue this treatment plan.         Dyspnea on exertion - Primary (Chronic)    Overview     Some dyspnea on exertion since having COVID, seems to be a chronic condition.         Current Assessment & Plan     Continues to have some MIJARES, we will continue to monitor.            Orthopedic    Chronic left shoulder pain (Chronic)    Current Assessment & Plan     Improved, not sure of the etiology, if he continues to have pain I suggested we try a steroid injection in the shoulder.               Current Outpatient Medications   Medication Instructions    albuterol (PROVENTIL/VENTOLIN HFA) 90 mcg/actuation inhaler Inhalation    aspirin (ECOTRIN) 81 mg, Oral    clopidogreL (PLAVIX) 75 mg, Oral, Daily    hydroCHLOROthiazide (HYDRODIURIL) 12.5 mg, Oral, Daily    levothyroxine (SYNTHROID) 88 mcg, Oral, Before breakfast    metoprolol tartrate (LOPRESSOR) 25 mg, Oral, Daily    mv-mn/iron/folic acid/herb 190 (VITAMIN D3 COMPLETE ORAL) Oral    nitroGLYCERIN (NITROSTAT) 0.4 mg, Sublingual, Every 5 min PRN    pravastatin (PRAVACHOL) 40 mg, Oral, Daily     sertraline (ZOLOFT) 50 mg, Oral, Daily    VIAGRA 50 mg tablet SMARTSI.5 Tablet(s) By Mouth As Directed    vitamin D (VITAMIN D3) 5,000 Units, Oral    zinc sulfate (ZINCATE) 50 mg, Oral     Plan:             Wellness exam scheduled early November.

## 2023-08-28 ENCOUNTER — OFFICE VISIT (OUTPATIENT)
Dept: PRIMARY CARE CLINIC | Facility: CLINIC | Age: 73
End: 2023-08-28
Payer: MEDICARE

## 2023-08-28 VITALS
WEIGHT: 218 LBS | RESPIRATION RATE: 18 BRPM | HEIGHT: 65 IN | DIASTOLIC BLOOD PRESSURE: 57 MMHG | HEART RATE: 62 BPM | BODY MASS INDEX: 36.32 KG/M2 | OXYGEN SATURATION: 98 % | SYSTOLIC BLOOD PRESSURE: 118 MMHG

## 2023-08-28 DIAGNOSIS — I10 PRIMARY HYPERTENSION: Chronic | ICD-10-CM

## 2023-08-28 DIAGNOSIS — G89.29 CHRONIC LEFT SHOULDER PAIN: Chronic | ICD-10-CM

## 2023-08-28 DIAGNOSIS — M25.512 CHRONIC LEFT SHOULDER PAIN: Chronic | ICD-10-CM

## 2023-08-28 DIAGNOSIS — R06.09 DYSPNEA ON EXERTION: Primary | Chronic | ICD-10-CM

## 2023-08-28 PROCEDURE — 3066F NEPHROPATHY DOC TX: CPT | Mod: CPTII,,, | Performed by: GENERAL PRACTICE

## 2023-08-28 PROCEDURE — 3078F PR MOST RECENT DIASTOLIC BLOOD PRESSURE < 80 MM HG: ICD-10-PCS | Mod: CPTII,,, | Performed by: GENERAL PRACTICE

## 2023-08-28 PROCEDURE — 1159F MED LIST DOCD IN RCRD: CPT | Mod: CPTII,,, | Performed by: GENERAL PRACTICE

## 2023-08-28 PROCEDURE — 3074F PR MOST RECENT SYSTOLIC BLOOD PRESSURE < 130 MM HG: ICD-10-PCS | Mod: CPTII,,, | Performed by: GENERAL PRACTICE

## 2023-08-28 PROCEDURE — 3066F PR DOCUMENTATION OF TREATMENT FOR NEPHROPATHY: ICD-10-PCS | Mod: CPTII,,, | Performed by: GENERAL PRACTICE

## 2023-08-28 PROCEDURE — 3074F SYST BP LT 130 MM HG: CPT | Mod: CPTII,,, | Performed by: GENERAL PRACTICE

## 2023-08-28 PROCEDURE — 99213 OFFICE O/P EST LOW 20 MIN: CPT | Mod: ,,, | Performed by: GENERAL PRACTICE

## 2023-08-28 PROCEDURE — 3061F PR NEG MICROALBUMINURIA RESULT DOCUMENTED/REVIEW: ICD-10-PCS | Mod: CPTII,,, | Performed by: GENERAL PRACTICE

## 2023-08-28 PROCEDURE — 2023F DILAT RTA XM W/O RTNOPTHY: CPT | Mod: CPTII,,, | Performed by: GENERAL PRACTICE

## 2023-08-28 PROCEDURE — 4010F PR ACE/ARB THEARPY RXD/TAKEN: ICD-10-PCS | Mod: CPTII,,, | Performed by: GENERAL PRACTICE

## 2023-08-28 PROCEDURE — 1159F PR MEDICATION LIST DOCUMENTED IN MEDICAL RECORD: ICD-10-PCS | Mod: CPTII,,, | Performed by: GENERAL PRACTICE

## 2023-08-28 PROCEDURE — 1160F PR REVIEW ALL MEDS BY PRESCRIBER/CLIN PHARMACIST DOCUMENTED: ICD-10-PCS | Mod: CPTII,,, | Performed by: GENERAL PRACTICE

## 2023-08-28 PROCEDURE — 3044F PR MOST RECENT HEMOGLOBIN A1C LEVEL <7.0%: ICD-10-PCS | Mod: CPTII,,, | Performed by: GENERAL PRACTICE

## 2023-08-28 PROCEDURE — 3061F NEG MICROALBUMINURIA REV: CPT | Mod: CPTII,,, | Performed by: GENERAL PRACTICE

## 2023-08-28 PROCEDURE — 3078F DIAST BP <80 MM HG: CPT | Mod: CPTII,,, | Performed by: GENERAL PRACTICE

## 2023-08-28 PROCEDURE — 3044F HG A1C LEVEL LT 7.0%: CPT | Mod: CPTII,,, | Performed by: GENERAL PRACTICE

## 2023-08-28 PROCEDURE — 4010F ACE/ARB THERAPY RXD/TAKEN: CPT | Mod: CPTII,,, | Performed by: GENERAL PRACTICE

## 2023-08-28 PROCEDURE — 2023F PR DILATED RETINAL EXAM W/O EVID OF RETINOPATHY: ICD-10-PCS | Mod: CPTII,,, | Performed by: GENERAL PRACTICE

## 2023-08-28 PROCEDURE — 1160F RVW MEDS BY RX/DR IN RCRD: CPT | Mod: CPTII,,, | Performed by: GENERAL PRACTICE

## 2023-08-28 PROCEDURE — 99213 PR OFFICE/OUTPT VISIT, EST, LEVL III, 20-29 MIN: ICD-10-PCS | Mod: ,,, | Performed by: GENERAL PRACTICE

## 2023-08-28 NOTE — ASSESSMENT & PLAN NOTE
Improved, not sure of the etiology, if he continues to have pain I suggested we try a steroid injection in the shoulder.

## 2023-09-28 ENCOUNTER — DOCUMENTATION ONLY (OUTPATIENT)
Dept: PRIMARY CARE CLINIC | Facility: CLINIC | Age: 73
End: 2023-09-28
Payer: MEDICARE

## 2023-10-26 ENCOUNTER — CLINICAL SUPPORT (OUTPATIENT)
Dept: PRIMARY CARE CLINIC | Facility: CLINIC | Age: 73
End: 2023-10-26
Payer: MEDICARE

## 2023-10-26 DIAGNOSIS — E78.5 DYSLIPIDEMIA: Chronic | ICD-10-CM

## 2023-10-26 DIAGNOSIS — D64.9 ANEMIA, UNSPECIFIED TYPE: ICD-10-CM

## 2023-10-26 DIAGNOSIS — E11.69 TYPE 2 DIABETES MELLITUS WITH OTHER SPECIFIED COMPLICATION, WITHOUT LONG-TERM CURRENT USE OF INSULIN: Chronic | ICD-10-CM

## 2023-10-26 DIAGNOSIS — E03.9 ACQUIRED HYPOTHYROIDISM: Chronic | ICD-10-CM

## 2023-10-26 DIAGNOSIS — Z00.00 WELLNESS EXAMINATION: ICD-10-CM

## 2023-10-26 LAB
ALBUMIN SERPL-MCNC: 4.1 G/DL (ref 3.4–4.8)
ALBUMIN/GLOB SERPL: 1.1 RATIO (ref 1.1–2)
ALP SERPL-CCNC: 92 UNIT/L (ref 40–150)
ALT SERPL-CCNC: 32 UNIT/L (ref 0–55)
AST SERPL-CCNC: 31 UNIT/L (ref 5–34)
BASOPHILS # BLD AUTO: 0.13 X10(3)/MCL
BASOPHILS NFR BLD AUTO: 1.6 %
BILIRUB SERPL-MCNC: 0.4 MG/DL
BUN SERPL-MCNC: 19.9 MG/DL (ref 8.4–25.7)
CALCIUM SERPL-MCNC: 9.6 MG/DL (ref 8.8–10)
CHLORIDE SERPL-SCNC: 107 MMOL/L (ref 98–107)
CHOLEST SERPL-MCNC: 230 MG/DL
CHOLEST/HDLC SERPL: 5 {RATIO} (ref 0–5)
CO2 SERPL-SCNC: 25 MMOL/L (ref 23–31)
CREAT SERPL-MCNC: 0.87 MG/DL (ref 0.73–1.18)
EOSINOPHIL # BLD AUTO: 0.22 X10(3)/MCL (ref 0–0.9)
EOSINOPHIL NFR BLD AUTO: 2.6 %
ERYTHROCYTE [DISTWIDTH] IN BLOOD BY AUTOMATED COUNT: 13.7 % (ref 11.5–17)
EST. AVERAGE GLUCOSE BLD GHB EST-MCNC: 114 MG/DL
GFR SERPLBLD CREATININE-BSD FMLA CKD-EPI: >60 MLS/MIN/1.73/M2
GLOBULIN SER-MCNC: 3.6 GM/DL (ref 2.4–3.5)
GLUCOSE SERPL-MCNC: 117 MG/DL (ref 82–115)
HBA1C MFR BLD: 5.6 %
HCT VFR BLD AUTO: 42.3 % (ref 42–52)
HDLC SERPL-MCNC: 44 MG/DL (ref 35–60)
HGB BLD-MCNC: 14.5 G/DL (ref 14–18)
IMM GRANULOCYTES # BLD AUTO: 0.04 X10(3)/MCL (ref 0–0.04)
IMM GRANULOCYTES NFR BLD AUTO: 0.5 %
LDLC SERPL CALC-MCNC: 147 MG/DL (ref 50–140)
LYMPHOCYTES # BLD AUTO: 2.38 X10(3)/MCL (ref 0.6–4.6)
LYMPHOCYTES NFR BLD AUTO: 28.6 %
MCH RBC QN AUTO: 30.3 PG (ref 27–31)
MCHC RBC AUTO-ENTMCNC: 34.3 G/DL (ref 33–36)
MCV RBC AUTO: 88.5 FL (ref 80–94)
MONOCYTES # BLD AUTO: 0.85 X10(3)/MCL (ref 0.1–1.3)
MONOCYTES NFR BLD AUTO: 10.2 %
NEUTROPHILS # BLD AUTO: 4.69 X10(3)/MCL (ref 2.1–9.2)
NEUTROPHILS NFR BLD AUTO: 56.5 %
NRBC BLD AUTO-RTO: 0 %
PLATELET # BLD AUTO: 286 X10(3)/MCL (ref 130–400)
PMV BLD AUTO: 9.9 FL (ref 7.4–10.4)
POTASSIUM SERPL-SCNC: 4.3 MMOL/L (ref 3.5–5.1)
PROT SERPL-MCNC: 7.7 GM/DL (ref 5.8–7.6)
RBC # BLD AUTO: 4.78 X10(6)/MCL (ref 4.7–6.1)
SODIUM SERPL-SCNC: 140 MMOL/L (ref 136–145)
TRIGL SERPL-MCNC: 194 MG/DL (ref 34–140)
TSH SERPL-ACNC: 3.87 UIU/ML (ref 0.35–4.94)
VLDLC SERPL CALC-MCNC: 39 MG/DL
WBC # SPEC AUTO: 8.31 X10(3)/MCL (ref 4.5–11.5)

## 2023-10-26 PROCEDURE — 36415 COLL VENOUS BLD VENIPUNCTURE: CPT

## 2023-10-26 PROCEDURE — 36415 PR COLLECTION VENOUS BLOOD,VENIPUNCTURE: ICD-10-PCS | Mod: ,,, | Performed by: GENERAL PRACTICE

## 2023-10-26 PROCEDURE — 36415 COLL VENOUS BLD VENIPUNCTURE: CPT | Mod: ,,, | Performed by: GENERAL PRACTICE

## 2023-10-26 NOTE — PROGRESS NOTES
Patient presented today for a Nurse Visit to have blood work drawn. One attempted draw to left arm with an 22 gauge needle, pt tolerated well with no complaints.

## 2023-11-01 ENCOUNTER — OFFICE VISIT (OUTPATIENT)
Dept: PRIMARY CARE CLINIC | Facility: CLINIC | Age: 73
End: 2023-11-01
Payer: MEDICARE

## 2023-11-01 VITALS
SYSTOLIC BLOOD PRESSURE: 137 MMHG | HEART RATE: 54 BPM | HEIGHT: 65 IN | RESPIRATION RATE: 18 BRPM | OXYGEN SATURATION: 99 % | WEIGHT: 218 LBS | BODY MASS INDEX: 36.32 KG/M2 | DIASTOLIC BLOOD PRESSURE: 70 MMHG

## 2023-11-01 DIAGNOSIS — R73.03 PRE-DIABETES: Chronic | ICD-10-CM

## 2023-11-01 DIAGNOSIS — Z95.5 PRESENCE OF STENT IN CORONARY ARTERY: Chronic | ICD-10-CM

## 2023-11-01 DIAGNOSIS — I10 PRIMARY HYPERTENSION: Chronic | ICD-10-CM

## 2023-11-01 DIAGNOSIS — E66.01 CLASS 2 SEVERE OBESITY DUE TO EXCESS CALORIES WITH SERIOUS COMORBIDITY AND BODY MASS INDEX (BMI) OF 36.0 TO 36.9 IN ADULT: Chronic | ICD-10-CM

## 2023-11-01 DIAGNOSIS — E03.9 ACQUIRED HYPOTHYROIDISM: Chronic | ICD-10-CM

## 2023-11-01 DIAGNOSIS — Z00.00 MEDICARE ANNUAL WELLNESS VISIT, SUBSEQUENT: Primary | ICD-10-CM

## 2023-11-01 DIAGNOSIS — F32.A DEPRESSIVE DISORDER: ICD-10-CM

## 2023-11-01 DIAGNOSIS — E78.5 DYSLIPIDEMIA: Chronic | ICD-10-CM

## 2023-11-01 DIAGNOSIS — N52.8 OTHER MALE ERECTILE DYSFUNCTION: Chronic | ICD-10-CM

## 2023-11-01 PROCEDURE — 4010F ACE/ARB THERAPY RXD/TAKEN: CPT | Mod: CPTII,,, | Performed by: GENERAL PRACTICE

## 2023-11-01 PROCEDURE — 3078F DIAST BP <80 MM HG: CPT | Mod: CPTII,,, | Performed by: GENERAL PRACTICE

## 2023-11-01 PROCEDURE — 1101F PR PT FALLS ASSESS DOC 0-1 FALLS W/OUT INJ PAST YR: ICD-10-PCS | Mod: CPTII,,, | Performed by: GENERAL PRACTICE

## 2023-11-01 PROCEDURE — 1159F MED LIST DOCD IN RCRD: CPT | Mod: CPTII,,, | Performed by: GENERAL PRACTICE

## 2023-11-01 PROCEDURE — 3075F PR MOST RECENT SYSTOLIC BLOOD PRESS GE 130-139MM HG: ICD-10-PCS | Mod: CPTII,,, | Performed by: GENERAL PRACTICE

## 2023-11-01 PROCEDURE — 3075F SYST BP GE 130 - 139MM HG: CPT | Mod: CPTII,,, | Performed by: GENERAL PRACTICE

## 2023-11-01 PROCEDURE — 1159F PR MEDICATION LIST DOCUMENTED IN MEDICAL RECORD: ICD-10-PCS | Mod: CPTII,,, | Performed by: GENERAL PRACTICE

## 2023-11-01 PROCEDURE — 1101F PT FALLS ASSESS-DOCD LE1/YR: CPT | Mod: CPTII,,, | Performed by: GENERAL PRACTICE

## 2023-11-01 PROCEDURE — 3061F NEG MICROALBUMINURIA REV: CPT | Mod: CPTII,,, | Performed by: GENERAL PRACTICE

## 2023-11-01 PROCEDURE — 3288F PR FALLS RISK ASSESSMENT DOCUMENTED: ICD-10-PCS | Mod: CPTII,,, | Performed by: GENERAL PRACTICE

## 2023-11-01 PROCEDURE — 3066F PR DOCUMENTATION OF TREATMENT FOR NEPHROPATHY: ICD-10-PCS | Mod: CPTII,,, | Performed by: GENERAL PRACTICE

## 2023-11-01 PROCEDURE — 3061F PR NEG MICROALBUMINURIA RESULT DOCUMENTED/REVIEW: ICD-10-PCS | Mod: CPTII,,, | Performed by: GENERAL PRACTICE

## 2023-11-01 PROCEDURE — 1160F RVW MEDS BY RX/DR IN RCRD: CPT | Mod: CPTII,,, | Performed by: GENERAL PRACTICE

## 2023-11-01 PROCEDURE — 3044F PR MOST RECENT HEMOGLOBIN A1C LEVEL <7.0%: ICD-10-PCS | Mod: CPTII,,, | Performed by: GENERAL PRACTICE

## 2023-11-01 PROCEDURE — 3078F PR MOST RECENT DIASTOLIC BLOOD PRESSURE < 80 MM HG: ICD-10-PCS | Mod: CPTII,,, | Performed by: GENERAL PRACTICE

## 2023-11-01 PROCEDURE — 2023F PR DILATED RETINAL EXAM W/O EVID OF RETINOPATHY: ICD-10-PCS | Mod: CPTII,,, | Performed by: GENERAL PRACTICE

## 2023-11-01 PROCEDURE — 1158F PR ADVANCE CARE PLANNING DISCUSS DOCUMENTED IN MEDICAL RECORD: ICD-10-PCS | Mod: CPTII,,, | Performed by: GENERAL PRACTICE

## 2023-11-01 PROCEDURE — 4010F PR ACE/ARB THEARPY RXD/TAKEN: ICD-10-PCS | Mod: CPTII,,, | Performed by: GENERAL PRACTICE

## 2023-11-01 PROCEDURE — G0439 PPPS, SUBSEQ VISIT: HCPCS | Mod: ,,, | Performed by: GENERAL PRACTICE

## 2023-11-01 PROCEDURE — 1160F PR REVIEW ALL MEDS BY PRESCRIBER/CLIN PHARMACIST DOCUMENTED: ICD-10-PCS | Mod: CPTII,,, | Performed by: GENERAL PRACTICE

## 2023-11-01 PROCEDURE — 3066F NEPHROPATHY DOC TX: CPT | Mod: CPTII,,, | Performed by: GENERAL PRACTICE

## 2023-11-01 PROCEDURE — 3044F HG A1C LEVEL LT 7.0%: CPT | Mod: CPTII,,, | Performed by: GENERAL PRACTICE

## 2023-11-01 PROCEDURE — 1158F ADVNC CARE PLAN TLK DOCD: CPT | Mod: CPTII,,, | Performed by: GENERAL PRACTICE

## 2023-11-01 PROCEDURE — 3288F FALL RISK ASSESSMENT DOCD: CPT | Mod: CPTII,,, | Performed by: GENERAL PRACTICE

## 2023-11-01 PROCEDURE — G0439 PR MEDICARE ANNUAL WELLNESS SUBSEQUENT VISIT: ICD-10-PCS | Mod: ,,, | Performed by: GENERAL PRACTICE

## 2023-11-01 PROCEDURE — 2023F DILAT RTA XM W/O RTNOPTHY: CPT | Mod: CPTII,,, | Performed by: GENERAL PRACTICE

## 2023-11-01 RX ORDER — SILDENAFIL 100 MG/1
100 TABLET, FILM COATED ORAL DAILY PRN
Qty: 5 TABLET | Refills: 11 | Status: SHIPPED | OUTPATIENT
Start: 2023-11-01 | End: 2024-10-31

## 2023-11-01 RX ORDER — SERTRALINE HYDROCHLORIDE 50 MG/1
50 TABLET, FILM COATED ORAL DAILY
Qty: 90 TABLET | Refills: 3 | Status: SHIPPED | OUTPATIENT
Start: 2023-11-01 | End: 2024-10-31

## 2023-11-01 NOTE — ASSESSMENT & PLAN NOTE
Component Ref Range & Units 6 d ago 7 mo ago 1 yr ago 2 yr ago   TSH 0.350 - 4.940 uIU/mL 3.871  9.330 High   6.0245 High  R  1.7935 R      Most recent TSH/thyroid function appears to be normal, continue current dose of thyroid supplementation medication.

## 2023-11-01 NOTE — PROGRESS NOTES
Patient ID: [id number]     Chief Complaint: Annual Exam      HPI:     Steven Xavier is a 73 y.o. male here today for a Medicare Wellness. No other complaints today.       ----------------------------  Anemia  Hyperlipidemia  Hypertension  MEGAN (obstructive sleep apnea)  Thyroid disease     History reviewed. No pertinent surgical history.    Review of patient's allergies indicates:  No Known Allergies    Outpatient Medications Marked as Taking for the 11/1/23 encounter (Office Visit) with Jitendra Garvin MD   Medication Sig Dispense Refill    aspirin (ECOTRIN) 81 MG EC tablet Take 81 mg by mouth.      clopidogreL (PLAVIX) 75 mg tablet Take 75 mg by mouth once daily.      hydroCHLOROthiazide (HYDRODIURIL) 12.5 MG Tab Take 1 tablet (12.5 mg total) by mouth once daily. 90 tablet 3    levothyroxine (SYNTHROID) 88 MCG tablet Take 1 tablet (88 mcg total) by mouth before breakfast. 90 tablet 3    metoprolol tartrate (LOPRESSOR) 25 MG tablet Take 25 mg by mouth once daily.      mv-mn/iron/folic acid/herb 190 (VITAMIN D3 COMPLETE ORAL) Take by mouth.      nitroGLYCERIN (NITROSTAT) 0.4 MG SL tablet Place 1 tablet (0.4 mg total) under the tongue every 5 (five) minutes as needed for Chest pain. 30 tablet 11    pravastatin (PRAVACHOL) 40 MG tablet Take 1 tablet (40 mg total) by mouth once daily. 90 tablet 3    vitamin D (VITAMIN D3) 1000 units Tab Take 5,000 Units by mouth.         Social History     Socioeconomic History    Marital status:    Tobacco Use    Smoking status: Former     Current packs/day: 0.50     Average packs/day: 0.5 packs/day for 25.0 years (12.5 ttl pk-yrs)     Types: Cigarettes    Smokeless tobacco: Never        History reviewed. No pertinent family history.     Patient Care Team:  Jitendra Garvin MD as PCP - General (Family Medicine)  Oswaldo Brown MD as Consulting Physician (Cardiology)  Ilir Haines LPN as Care Coordinator     Opioid Screening: Patient medication list reviewed, patient is not  taking prescription opioids. Patient is not using additional opioids than prescribed. Patient is at low risk of substance abuse based on this opioid use history.       Subjective:     Review of Systems   Constitutional: Negative.  Negative for malaise/fatigue and weight loss.   HENT: Negative.     Eyes: Negative.    Respiratory: Negative.  Negative for shortness of breath.    Cardiovascular: Negative.  Negative for chest pain.   Gastrointestinal: Negative.    Genitourinary: Negative.    Musculoskeletal: Negative.    Skin: Negative.    Neurological: Negative.  Negative for focal weakness, weakness and headaches.   Endo/Heme/Allergies: Negative.    Psychiatric/Behavioral: Negative.  Negative for depression and memory loss. The patient is not nervous/anxious.          Patient Reported Health Risk Assessment  What [age] 70-79  Are you male or female?: Male  During the past four weeks, how much have you been bothered by emotional problems such as feeling anxious, depressed, irritable, sad, or downhearted and blue?: Not at all  During the past five weeks, has your physical and/or emotional health limited your social activities with family, friends, neighbors, or groups?: Not at all  During the past four weeks, how much bodily pain have you generally had?: No pain  During the past four weeks, was someone available to help if you needed and wanted help?: Yes, as much as I wanted  During the past four weeks, what was the hardest physical activity you could do for at least two minutes?: Light  Can you get to places out of walking distance without help?  (For example, can you travel alone on buses or taxis, or drive your own car?): Yes  Can you go shopping for groceries or clothes without someone's help?: Yes  Can you prepare your own meals?: Yes  Can you do your own housework without help?: Yes  Because of any health problems, do you need the help of another person with your personal care needs such as eating,  "bathing, dressing, or getting around the house?: No  Can you handle your own money without help?: Yes  During the past four weeks, how would you rate your health in general?: Good  How have things been going for you during the past four weeks?: Pretty well  Are you having difficulties driving your car?: No  Do you always fasten your seat belt when you are in a car?: Yes, usually  How often in the past four weeks have you been bothered by falling or dizzy when standing up?: Never  How often in the past four weeks have you been bothered by sexual problems?: Never  How often in the past four weeks have you been bothered by trouble eating well?: Never  How often in the past four weeks have you been bothered by teeth or denture problems?: Never  How often in the past four weeks have you been bothered with problems using the telephone?: Never  How often in the past four weeks have you been bothered by tiredness or fatigue?: Never  Have you fallen two or more times in the past year?: No  Are you afraid of falling?: No  Are you a smoker?: No  During the past four weeks, how many drinks of wine, beer, or other alcoholic beverages did you have?: One drink or less per week  Do you exercise for about 20 minutes three or more days a week?: No, I usually do not exercise this much  Have you been given any information to help you with hazards in your house that might hurt you?: No  Have you been given any information to help you with keeping track of your medications?: No  How often do you have trouble taking medicines the way you've been told to take them?: I always take them as prescribed  How confident are you that you can control and manage most of your health problems?: Very confident  What is your race? (Check all that apply.):     Objective:     /70   Pulse (!) 54   Resp 18   Ht 5' 5" (1.651 m)   Wt 98.9 kg (218 lb)   SpO2 99%   BMI 36.28 kg/m²     Physical Exam  Constitutional:       Appearance: He is " obese.   HENT:      Head: Normocephalic.      Mouth/Throat:      Mouth: Mucous membranes are moist.      Pharynx: Oropharynx is clear.   Eyes:      Pupils: Pupils are equal, round, and reactive to light.   Cardiovascular:      Rate and Rhythm: Normal rate and regular rhythm.   Pulmonary:      Effort: Pulmonary effort is normal.      Breath sounds: Normal breath sounds.   Abdominal:      Palpations: Abdomen is soft.   Musculoskeletal:         General: Normal range of motion.   Skin:     General: Skin is warm and dry.   Neurological:      General: No focal deficit present.      Mental Status: He is alert.   Psychiatric:         Mood and Affect: Mood normal.         Behavior: Behavior normal.         Thought Content: Thought content normal.         Judgment: Judgment normal.         Lab Results   Component Value Date     10/26/2023    K 4.3 10/26/2023     02/26/2022    CO2 25 10/26/2023    BUN 19.9 10/26/2023    CREATININE 0.87 10/26/2023    CALCIUM 9.6 10/26/2023    ALBUMIN 4.1 10/26/2023    BILITOT 0.4 10/26/2023    ALKPHOS 92 10/26/2023    AST 31 10/26/2023    ALT 32 10/26/2023    ANIONGAP 9 02/26/2022    ESTGFRAFRICA 107 02/26/2022    EGFRNORACEVR >60 10/26/2023     Lab Results   Component Value Date    WBC 8.31 10/26/2023    RBC 4.78 10/26/2023    HGB 14.5 10/26/2023    HCT 42.3 10/26/2023    MCV 88.5 10/26/2023    RDW 13.7 10/26/2023     10/26/2023      Lab Results   Component Value Date    CHOL 230 (H) 10/26/2023    TRIG 194 (H) 10/26/2023    HDL 44 10/26/2023    .00 (H) 10/26/2023    TOTALCHOLEST 5 10/26/2023     Lab Results   Component Value Date    TSH 3.871 10/26/2023     Lab Results   Component Value Date    HGBA1C 5.6 10/26/2023        Lab Results   Component Value Date    PSA 1.19 10/24/2022              No data to display                  11/1/2023     2:30 PM 10/27/2022     8:30 AM   Fall Risk Assessment - Outpatient   Mobility Status Ambulatory Ambulatory   Number of falls 0 0    Identified as fall risk False False           Depression Screening  Over the past two weeks, has the patient felt down, depressed, or hopeless?: No  Over the past two weeks, has the patient felt little interest or pleasure in doing things?: No  Functional Ability/Safety Screening  Was the patient's timed Up & Go test unsteady or longer than 30 seconds?: No  Does the patient need help with phone, transportation, shopping, preparing meals, housework, laundry, meds, or managing money?: No  Does the patient's home have rugs in the hallway, lack grab bars in the bathroom, lack handrails on the stairs or have poor lighting?: No  Have you noticed any hearing difficulties?: No  Cognitive Function (Assessed through direct observation with due consideration of information obtained by way of patient reports and/or concerns raised by family, friends, caretakers, or others)    Does the patient repeat questions/statements in the same day?: No  Does the patient have trouble remembering the date, year, and time?: No  Does the patient have difficulty managing finances?: No  Does the patient have a decreased sense of direction?: No  Assessment:       ICD-10-CM ICD-9-CM   1. Medicare annual wellness visit, subsequent  Z00.00 V70.0   2. Depressive disorder  F32.A 311   3. Other male erectile dysfunction  N52.8 607.84   4. Dyslipidemia  E78.5 272.4   5. Primary hypertension  I10 401.9   6. Acquired hypothyroidism  E03.9 244.9   7. Presence of stent in coronary artery  Z95.5 V45.82   8. Class 2 severe obesity due to excess calories with serious comorbidity and body mass index (BMI) of 36.0 to 36.9 in adult  E66.01 278.01    Z68.36 V85.36   9. Pre-diabetes  R73.03 790.29        Plan:     Medicare Annual Wellness and Personalized Prevention Plan:   Fall Risk + Home Safety + Hearing Impairment + Depression Screen + Cognitive Impairment Screen + Health Risk Assessment all reviewed.       Health Maintenance Topics with due status: Not Due        Topic Last Completion Date    Colorectal Cancer Screening 06/28/2022    Eye Exam 03/21/2023    Diabetes Urine Screening 03/30/2023    High Dose Statin 08/07/2023    Aspirin/Antiplatelet Therapy 08/07/2023    Lipid Panel 10/26/2023    Hemoglobin A1c 10/26/2023    Foot Exam 11/01/2023      The patient's Health Maintenance was reviewed and the following appears to be due at this time:   Health Maintenance Due   Topic Date Due    RSV Vaccine (Age 60+) (1 - 1-dose 60+ series) Never done    Abdominal Aortic Aneurysm Screening  Never done    Influenza Vaccine (1) 09/01/2023         1. Medicare annual wellness visit, subsequent  Comments:  Overall health status was reviewed.  Good health habits reinforced.  Annual wellness exams recommended.    2. Depressive disorder  -     sertraline (ZOLOFT) 50 MG tablet; Take 1 tablet (50 mg total) by mouth once daily.  Dispense: 90 tablet; Refill: 3    3. Other male erectile dysfunction  Overview:  Prescribed sildenafil 100 mg, use 1/2 tablet as needed.    Assessment & Plan:  Notably, patient has a prescription for nitroglycerin but has not use that in a very long time, explain that he can not use the medications together, he understands.    Orders:  -     sildenafiL (VIAGRA) 100 MG tablet; Take 1 tablet (100 mg total) by mouth daily as needed for Erectile Dysfunction (May take 1/2 tablet if effective).  Dispense: 5 tablet; Refill: 11    4. Dyslipidemia  Overview:  Prescribed pravastatin 40 mg daily.    Assessment & Plan:  Component Ref Range & Units 6 d ago 1 yr ago 2 yr ago   Cholesterol Total <=200 mg/dL 230 High   212 High   248 High  R    HDL Cholesterol 35 - 60 mg/dL 44  47  44    Triglyceride 34 - 140 mg/dL 194 High   138  150 High     Cholesterol/HDL Ratio 0 - 5 5  5  6 High     Very Low Density Lipoprotein  39  28  30    LDL Cholesterol 50.00 - 140.00 mg/dL 147.00 High   137.00  174.00 High       Lipids controlled to a reasonable extent, continue current treatment  "plan.      5. Primary hypertension  Overview:  Prescribed Lopressor 25 mg daily, hydrochlorothiazide 12.5 mg daily.    Assessment & Plan:  Blood pressures appear to be adequately controlled with current treatment plan, including prescription blood pressure medication. Continue medical management and continue home blood pressure checks.  Blood pressure should be checked as discussed during medical visits, and average blood pressure should be no greater than 130/80.      6. Acquired hypothyroidism  Overview:  Prescribed levothyroxine 88 mcg daily.    Assessment & Plan:  Component Ref Range & Units 6 d ago 7 mo ago 1 yr ago 2 yr ago   TSH 0.350 - 4.940 uIU/mL 3.871  9.330 High   6.0245 High  R  1.7935 R      Most recent TSH/thyroid function appears to be normal, continue current dose of thyroid supplementation medication.      7. Presence of stent in coronary artery  Assessment & Plan:  Medical condition is currently stable, continue current treatment plan.  Continue follow-up with Cardiology.      8. Class 2 severe obesity due to excess calories with serious comorbidity and body mass index (BMI) of 36.0 to 36.9 in adult  Assessment & Plan:  Weight loss, healthy dietary choices, increased activity/exercise are recommended.  To lose weight, it is generally recommended to restrict calories to approximately 1500 calories per day to lose 1 lb per week, and approximately 1200 calories per day to lose up to 2 lb per week.  Generally, this is a healthy amount of weight to lose, and an appropriate amount of time to lose this amount of weight.  Adding exercise will assist in losing weight, particularly aerobic exercise such as walking.  However, resistance training, or lifting weights" over time may assistant weight loss by increasing basal metabolic rate, or the rate at which your body burns calories during periods of inactivity.    Keep track of BMI (body mass index), below 25 is considered normal, over 25 but below 30 is " considered overweight, anything over 30 is considered moderately obese, over 35 severely obese, and over 40 is characterized as morbidly obese.      9. Pre-diabetes  Overview:  Not prescribed any medications for prediabetes.    Assessment & Plan:  Component Ref Range & Units 6 d ago 7 mo ago 1 yr ago 2 yr ago   Hemoglobin A1c <=7.0 % 5.6  6.0  6.0  5.6 R    Estimated Average Glucose mg/dL 114.0  125.5  125.5  114.0      Most recent hemoglobin A1c continues to be in or below the prediabetes range.  Continue current treatment plan diet, lifestyle modification.  While it is less than optimal for blood sugars to remain in the prediabetes range, it is essential that blood sugars do not rise to the point of becoming a type II diabetic.  We will continue to monitor closely.              Advance Care Planning     Date: 11/01/2023    Living Will  During this visit, I engaged the patient  in the voluntary advance care planning process.  The patient and I reviewed the role for advance directives and their purpose in directing future healthcare if the patient's unable to speak for him/herself.  At this point in time, the patient does have full decision-making capacity.  We discussed different extreme health states that he could experience, and reviewed what kind of medical care he would want in those situations.  The patient communicated that if he were comatose and had little chance of a meaningful recovery, he would not want machines/life-sustaining treatments used. In addition to the above preference, other important end-of-life issues for the patient include no other issues.  Patient does have a living will/advanced care planning, will bring a copy to the clinic so that we can place it in the chart.  Patient may also be given the option to complete our advanced care planning paperwork so that we may enter it into the medical record today.  I spent a total of 5 minutes engaging the patient in this advance care planning  discussion.              Current Outpatient Medications   Medication Instructions    albuterol (PROVENTIL/VENTOLIN HFA) 90 mcg/actuation inhaler Inhalation    aspirin (ECOTRIN) 81 mg, Oral    clopidogreL (PLAVIX) 75 mg, Oral, Daily    hydroCHLOROthiazide (HYDRODIURIL) 12.5 mg, Oral, Daily    levothyroxine (SYNTHROID) 88 mcg, Oral, Before breakfast    metoprolol tartrate (LOPRESSOR) 25 mg, Oral, Daily    mv-mn/iron/folic acid/herb 190 (VITAMIN D3 COMPLETE ORAL) Oral    nitroGLYCERIN (NITROSTAT) 0.4 mg, Sublingual, Every 5 min PRN    pravastatin (PRAVACHOL) 40 mg, Oral, Daily    sertraline (ZOLOFT) 50 mg, Oral, Daily    sildenafiL (VIAGRA) 100 mg, Oral, Daily PRN    vitamin D (VITAMIN D3) 5,000 Units, Oral    zinc sulfate (ZINCATE) 50 mg, Oral        Follow up in about 6 months (around 5/1/2024) for Chronic condition F/up. In addition to their scheduled follow up, the patient has also been instructed to follow up on as needed basis.

## 2023-11-01 NOTE — ASSESSMENT & PLAN NOTE
Medical condition is currently stable, continue current treatment plan.  Continue follow-up with Cardiology.   No

## 2023-11-01 NOTE — ASSESSMENT & PLAN NOTE
Component Ref Range & Units 6 d ago 1 yr ago 2 yr ago   Cholesterol Total <=200 mg/dL 230 High   212 High   248 High  R    HDL Cholesterol 35 - 60 mg/dL 44  47  44    Triglyceride 34 - 140 mg/dL 194 High   138  150 High     Cholesterol/HDL Ratio 0 - 5 5  5  6 High     Very Low Density Lipoprotein  39  28  30    LDL Cholesterol 50.00 - 140.00 mg/dL 147.00 High   137.00  174.00 High       Lipids controlled to a reasonable extent, continue current treatment plan.

## 2023-11-01 NOTE — ASSESSMENT & PLAN NOTE
Notably, patient has a prescription for nitroglycerin but has not use that in a very long time, explain that he can not use the medications together, he understands.

## 2023-12-05 ENCOUNTER — TELEPHONE (OUTPATIENT)
Dept: PRIMARY CARE CLINIC | Facility: CLINIC | Age: 73
End: 2023-12-05
Payer: MEDICARE

## 2023-12-05 NOTE — TELEPHONE ENCOUNTER
----- Message from Kyleigh Romeo LPN sent at 12/5/2023  7:58 AM CST -----  Regarding: FW: advice  Please contact to schedule an appointment to discuss his message  due to needing an surgical referral, thanks  ----- Message -----  From: Marialuisa Rock  Sent: 12/4/2023   4:37 PM CST  To: [facility] Staff  Subject: advice                                           Type:  Needs Medical Advice    Who Called: pt  Symptoms (please be specific): possible hernia in groin area   How long has patient had these symptoms:  couple months    Best Call Back Number: [phone]  Additional Information: needing to speak to a nurse about a possible hernia in groin area. Please advise

## 2023-12-13 ENCOUNTER — TELEPHONE (OUTPATIENT)
Dept: PRIMARY CARE CLINIC | Facility: CLINIC | Age: 73
End: 2023-12-13
Payer: MEDICARE

## 2023-12-13 DIAGNOSIS — E78.5 DYSLIPIDEMIA: Chronic | ICD-10-CM

## 2023-12-13 RX ORDER — PRAVASTATIN SODIUM 40 MG/1
40 TABLET ORAL DAILY
Qty: 90 TABLET | Refills: 3 | Status: SHIPPED | OUTPATIENT
Start: 2023-12-13 | End: 2024-12-12

## 2023-12-13 NOTE — TELEPHONE ENCOUNTER
----- Message from Marialuisa Rock sent at 12/13/2023  8:10 AM CST -----  Regarding: refilll  Type:  RX Refill Request    Who Called: pt    RX Name and Strength:pravastatin (PRAVACHOL) 40 MG tablet    Preferred Pharmacy with phone number:[facility] in [place]    Best Call Back Number:[phone]  Additional Information: called about needing a refill. Please advise

## 2024-03-12 ENCOUNTER — TELEPHONE (OUTPATIENT)
Dept: PRIMARY CARE CLINIC | Facility: CLINIC | Age: 74
End: 2024-03-12
Payer: MEDICARE

## 2024-03-12 DIAGNOSIS — I10 PRIMARY HYPERTENSION: Chronic | ICD-10-CM

## 2024-03-12 RX ORDER — HYDROCHLOROTHIAZIDE 12.5 MG/1
12.5 TABLET ORAL DAILY
Qty: 90 TABLET | Refills: 3 | Status: SHIPPED | OUTPATIENT
Start: 2024-03-12 | End: 2025-03-12

## 2024-03-12 NOTE — TELEPHONE ENCOUNTER
----- Message from Marialuisa Rock sent at 3/12/2024 10:14 AM CDT -----  Regarding: refill  Type:  RX Refill Request    Who Called: pt    RX Name and Strength:hydroCHLOROthiazide (HYDRODIURIL) 12.5 MG Tab     Preferred [facility] phone number[facility] [place]    Best Call Back Number:[phone]  Additional Information:

## 2024-05-01 PROBLEM — R73.03 PREDIABETES: Status: ACTIVE | Noted: 2022-10-27

## 2024-05-01 PROBLEM — D64.9 ANEMIA: Status: RESOLVED | Noted: 2022-10-26 | Resolved: 2024-05-01

## 2024-05-01 NOTE — PROGRESS NOTES
"Subjective:       Patient ID: Steven Xavier is a 73 y.o. male.    Chief Complaint: No chief complaint on file.    Patient presents to the clinic today for chronic condition follow-up.  Doing well, no specific complaints, tolerating all medications, reporting no significant side effects or problems.    Last wellness exam was 11/01/2023.    Chronic conditions being treated include but are not limited to primary hypertension, prediabetes, dyslipidemia, history of STEMI/coronary artery disease, depression.      Review of Systems   Constitutional: Negative.  Negative for fatigue and fever.   HENT: Negative.  Negative for sore throat and trouble swallowing.    Eyes: Negative.  Negative for redness and visual disturbance.   Respiratory: Negative.  Negative for chest tightness and shortness of breath.    Cardiovascular: Negative.  Negative for chest pain.   Gastrointestinal: Negative.  Negative for abdominal pain, blood in stool and nausea.   Endocrine: Negative.  Negative for cold intolerance, heat intolerance and polyuria.   Genitourinary: Negative.    Musculoskeletal: Negative.  Negative for arthralgias, gait problem and joint swelling.   Integumentary:  Negative for rash. Negative.   Neurological: Negative.  Negative for dizziness, weakness and headaches.   Psychiatric/Behavioral: Negative.  Negative for agitation and dysphoric mood.            Patient Care Team:  Jitendra Garvin MD as PCP - General (Family Medicine)  Oswaldo Brown MD as Consulting Physician (Cardiology)  Ilir Haines LPN as Care Coordinator  Objective:   Visit Vitals  /71   Pulse (!) 51   Resp 18   Ht 5' 5" (1.651 m)   Wt 102.1 kg (225 lb)   SpO2 99%   BMI 37.44 kg/m²        Physical Exam  Constitutional:       Appearance: He is obese.   HENT:      Head: Normocephalic.      Mouth/Throat:      Mouth: Mucous membranes are moist.      Pharynx: Oropharynx is clear.   Eyes:      Pupils: Pupils are equal, round, and reactive to light. "   Cardiovascular:      Rate and Rhythm: Normal rate and regular rhythm.   Pulmonary:      Effort: Pulmonary effort is normal.      Breath sounds: Normal breath sounds.   Abdominal:      Palpations: Abdomen is soft.   Musculoskeletal:         General: Normal range of motion.   Skin:     General: Skin is warm and dry.   Neurological:      General: No focal deficit present.      Mental Status: He is alert.   Psychiatric:         Mood and Affect: Mood normal.         Behavior: Behavior normal.         Thought Content: Thought content normal.         Judgment: Judgment normal.           Lab Results   Component Value Date     10/26/2023    K 4.3 10/26/2023     02/26/2022    CO2 25 10/26/2023    BUN 19.9 10/26/2023    CREATININE 0.87 10/26/2023    CALCIUM 9.6 10/26/2023    ALBUMIN 4.1 10/26/2023    BILITOT 0.4 10/26/2023    ALKPHOS 92 10/26/2023    AST 31 10/26/2023    ALT 32 10/26/2023    ANIONGAP 9 02/26/2022    ESTGFRAFRICA 107 02/26/2022    EGFRNORACEVR >60 10/26/2023     Lab Results   Component Value Date    WBC 8.31 10/26/2023    RBC 4.78 10/26/2023    HGB 14.5 10/26/2023    HCT 42.3 10/26/2023    MCV 88.5 10/26/2023    RDW 13.7 10/26/2023     10/26/2023      Lab Results   Component Value Date    CHOL 230 (H) 10/26/2023    TRIG 194 (H) 10/26/2023    HDL 44 10/26/2023    .00 (H) 10/26/2023    TOTALCHOLEST 5 10/26/2023     Lab Results   Component Value Date    TSH 3.871 10/26/2023     Lab Results   Component Value Date    HGBA1C 5.6 10/26/2023        Lab Results   Component Value Date    PSA 1.19 10/24/2022         Assessment:       ICD-10-CM ICD-9-CM   1. Primary hypertension  I10 401.9   2. Prediabetes  R73.03 790.29   3. Dyslipidemia  E78.5 272.4   4. Depressive disorder  F32.A 311   5. Presence of stent in coronary artery  Z95.5 V45.82   6. History of coronary artery bypass graft  Z95.1 V45.81   7. Chronic pulmonary hypertension  I27.20 416.8   8. Arteriosclerosis of coronary artery   I25.10 414.00   9. H/O non-ST elevation myocardial infarction (NSTEMI)  I25.2 412   10. Disorder of arteries and arterioles, unspecified  I77.9 447.9   11. Class 2 severe obesity due to excess calories with serious comorbidity and body mass index (BMI) of 36.0 to 36.9 in adult  E66.01 278.01    Z68.36 V85.36   12. Other male erectile dysfunction  N52.8 607.84   13. Acquired hypothyroidism  E03.9 244.9   14. Obstructive sleep apnea syndrome  G47.33 327.23       Current Outpatient Medications   Medication Instructions    albuterol (PROVENTIL/VENTOLIN HFA) 90 mcg/actuation inhaler Inhalation    aspirin (ECOTRIN) 81 mg, Oral    clopidogreL (PLAVIX) 75 mg, Oral, Daily    hydroCHLOROthiazide (HYDRODIURIL) 12.5 mg, Oral, Daily    levothyroxine (SYNTHROID) 88 mcg, Oral, Before breakfast    metoprolol tartrate (LOPRESSOR) 25 mg, Oral, Daily    mv-mn/iron/folic acid/herb 190 (VITAMIN D3 COMPLETE ORAL) Oral    nitroGLYCERIN (NITROSTAT) 0.4 mg, Sublingual, Every 5 min PRN    NORVASC 2.5 mg, Oral, Nightly    pravastatin (PRAVACHOL) 40 mg, Oral, Daily    sertraline (ZOLOFT) 50 mg, Oral, Daily    sildenafiL (VIAGRA) 100 mg, Oral, Daily PRN    vitamin D (VITAMIN D3) 5,000 Units, Oral    zinc sulfate (ZINCATE) 50 mg, Oral     Plan:     Problem List Items Addressed This Visit          Psychiatric    Depressive disorder (Chronic)    Overview     Prescribed sertraline 50 mg daily.    Patient reports stability of moods, and effectiveness of medications, including no agitation, significant somnolence, or significant bouts of anxiety or depression.  Patient is not having any sleep disturbance.  Current treatment plan will continue, with plans to discuss any significant changes in patient's status if necessary if anything changes in the future.            Cardiac/Vascular    Disorder of arteries and arterioles, unspecified (Chronic)    Overview     Patient does have atherosclerosis, sees Dr. Brown, regular follow-up, condition appears to  be stable.    Cardiac catheterization scheduled for 05/10/2024.         Primary hypertension - Primary (Chronic)    Overview     Prescribed Lopressor 25 mg daily, hydrochlorothiazide 12.5 mg daily.    Blood pressures appear to be adequately controlled with current treatment plan, including prescription blood pressure medication. Continue medical management and continue home blood pressure checks.  Blood pressure should be checked as discussed during medical visits, and average blood pressure should be no greater than 130/80.         Relevant Orders    Comprehensive Metabolic Panel    CBC Auto Differential    Dyslipidemia (Chronic)    Overview     Prescribed pravastatin 40 mg daily.    Most recent cholesterol/lipid blood testing shows adequate control, continue current treatment plan, including prescription medications if prescribed, and/or diet high in fiber, low in saturated fats as discussed during clinic visit.         Relevant Orders    Lipid Panel    Arteriosclerosis of coronary artery (Chronic)    Overview     Prescribed nitroglycerin that he does not use very often, understands not to use it with his Viagra.    Sees Cardiology, cardiac catheterization scheduled for 05/10/2024.  Two-vessel CABG 1990, six cardiac stents.         History of coronary artery bypass graft (Chronic)    Overview     History of two vessel CABG, 1990.         H/O non-ST elevation myocardial infarction (NSTEMI) (Chronic)    Overview     Suffered NSTEMI February 2022, underwent cardiac rehabilitation, seems to be doing well.         Presence of stent in coronary artery (Chronic)    Overview     Sees Cardiology, Dr. Brown.  Has six cardiac stents.    Cardiac condition has been stable, continue follow-up with Cardiology.         Relevant Medications    nitroGLYCERIN (NITROSTAT) 0.4 MG SL tablet    Chronic pulmonary hypertension (Chronic)    Overview     From echo February 2022:  · Normal left ventricle cavity size. Normal (55-65%) ejection  "fraction.   Mild concentric hypertrophy observed.   · Mild mitral regurgitation   · Mild pulmonary hypertension present.     Left Ventricle   Normal left ventricle cavity size. Normal (55-65%) ejection fraction. Mild concentric hypertrophy observed.     Sees Cardiology, Dr. Issa, scheduled for a cardiac catheterization next week, CABG 1990, two-vessel, six stents, followed closely by Cardiology, appears to be doing well.            Renal/    Other male erectile dysfunction (Chronic)    Overview     Prescribed sildenafil 100 mg, use 1/2 tablet as needed.    Patient takes medication as needed for erectile dysfunction, medication is effective, and does not cause any significant side effects.  He does understand not to take the nitroglycerin with his Viagra.            Endocrine    Class 2 severe obesity due to excess calories with serious comorbidity and body mass index (BMI) of 36.0 to 36.9 in adult (Chronic)    Overview     Patient continues to have a BMI that falls in the obesity range.    Weight loss, healthy dietary choices, increased activity/exercise are recommended.  To lose weight, it is generally recommended to restrict calories to approximately 1500 calories per day to lose 1 lb per week, and approximately 1200 calories per day to lose up to 2 lb per week.  Generally, this is a healthy amount of weight to lose, and an appropriate amount of time to lose this amount of weight.  Adding exercise will assist in losing weight, particularly aerobic exercise such as walking.  However, resistance training, or lifting weights" over time may assistant weight loss by increasing basal metabolic rate, or the rate at which your body burns calories during periods of inactivity.    Keep track of BMI (body mass index), below 25 is considered normal, over 25 but below 30 is considered overweight, anything over 30 is considered moderately obese, over 35 severely obese, and over 40 is characterized as morbidly obese.         " Acquired hypothyroidism (Chronic)    Overview     Prescribed levothyroxine 88 mcg daily.    Dose decreased November 2023, due to jitteriness, feels better at visit on 05/02/2024.    Most recent TSH/thyroid function appears to be normal, continue current dose of thyroid supplementation medication.         Relevant Medications    levothyroxine (SYNTHROID) 88 MCG tablet    Other Relevant Orders    TSH    Prediabetes    Overview     Patient has prediabetes and is not prescribed medication.  Patient's prediabetes is treated and controlled adequately using conservative means, specifically lifestyle modification, dietary changes, and/or increase in activity/exercise.         Relevant Orders    Hemoglobin A1C       Other    Obstructive sleep apnea syndrome (Chronic)    Overview     Patient's sleep apnea condition is stable, continue current treatment plan.                    Follow up in about 6 months (around 11/6/2024) for Medicare Wellness.

## 2024-05-02 ENCOUNTER — OFFICE VISIT (OUTPATIENT)
Dept: PRIMARY CARE CLINIC | Facility: CLINIC | Age: 74
End: 2024-05-02
Payer: MEDICARE

## 2024-05-02 VITALS
OXYGEN SATURATION: 99 % | BODY MASS INDEX: 37.49 KG/M2 | HEIGHT: 65 IN | WEIGHT: 225 LBS | RESPIRATION RATE: 18 BRPM | DIASTOLIC BLOOD PRESSURE: 71 MMHG | SYSTOLIC BLOOD PRESSURE: 131 MMHG | HEART RATE: 51 BPM

## 2024-05-02 DIAGNOSIS — E78.5 DYSLIPIDEMIA: Chronic | ICD-10-CM

## 2024-05-02 DIAGNOSIS — N52.8 OTHER MALE ERECTILE DYSFUNCTION: Chronic | ICD-10-CM

## 2024-05-02 DIAGNOSIS — G47.33 OBSTRUCTIVE SLEEP APNEA SYNDROME: Chronic | ICD-10-CM

## 2024-05-02 DIAGNOSIS — I10 PRIMARY HYPERTENSION: Primary | Chronic | ICD-10-CM

## 2024-05-02 DIAGNOSIS — F32.A DEPRESSIVE DISORDER: Chronic | ICD-10-CM

## 2024-05-02 DIAGNOSIS — R73.03 PREDIABETES: ICD-10-CM

## 2024-05-02 DIAGNOSIS — E03.9 ACQUIRED HYPOTHYROIDISM: Chronic | ICD-10-CM

## 2024-05-02 DIAGNOSIS — I77.9 DISORDER OF ARTERIES AND ARTERIOLES, UNSPECIFIED: Chronic | ICD-10-CM

## 2024-05-02 DIAGNOSIS — Z95.1 HISTORY OF CORONARY ARTERY BYPASS GRAFT: Chronic | ICD-10-CM

## 2024-05-02 DIAGNOSIS — Z95.5 PRESENCE OF STENT IN CORONARY ARTERY: Chronic | ICD-10-CM

## 2024-05-02 DIAGNOSIS — E66.01 CLASS 2 SEVERE OBESITY DUE TO EXCESS CALORIES WITH SERIOUS COMORBIDITY AND BODY MASS INDEX (BMI) OF 36.0 TO 36.9 IN ADULT: Chronic | ICD-10-CM

## 2024-05-02 DIAGNOSIS — I25.2 H/O NON-ST ELEVATION MYOCARDIAL INFARCTION (NSTEMI): Chronic | ICD-10-CM

## 2024-05-02 DIAGNOSIS — I25.10 ARTERIOSCLEROSIS OF CORONARY ARTERY: Chronic | ICD-10-CM

## 2024-05-02 DIAGNOSIS — I27.20 CHRONIC PULMONARY HYPERTENSION: Chronic | ICD-10-CM

## 2024-05-02 PROCEDURE — 99214 OFFICE O/P EST MOD 30 MIN: CPT | Mod: ,,, | Performed by: GENERAL PRACTICE

## 2024-05-02 PROCEDURE — 3075F SYST BP GE 130 - 139MM HG: CPT | Mod: CPTII,,, | Performed by: GENERAL PRACTICE

## 2024-05-02 PROCEDURE — 3078F DIAST BP <80 MM HG: CPT | Mod: CPTII,,, | Performed by: GENERAL PRACTICE

## 2024-05-02 PROCEDURE — 1160F RVW MEDS BY RX/DR IN RCRD: CPT | Mod: CPTII,,, | Performed by: GENERAL PRACTICE

## 2024-05-02 PROCEDURE — 1159F MED LIST DOCD IN RCRD: CPT | Mod: CPTII,,, | Performed by: GENERAL PRACTICE

## 2024-05-02 RX ORDER — LEVOTHYROXINE SODIUM 88 UG/1
88 TABLET ORAL
Qty: 90 TABLET | Refills: 3 | Status: SHIPPED | OUTPATIENT
Start: 2024-05-02 | End: 2024-05-23 | Stop reason: SDUPTHER

## 2024-05-02 RX ORDER — AMLODIPINE BESYLATE 2.5 MG
2.5 TABLET ORAL NIGHTLY
COMMUNITY
Start: 2024-04-09

## 2024-05-02 RX ORDER — NITROGLYCERIN 0.4 MG/1
0.4 TABLET SUBLINGUAL EVERY 5 MIN PRN
Qty: 30 TABLET | Refills: 11 | Status: SHIPPED | OUTPATIENT
Start: 2024-05-02 | End: 2025-05-02

## 2024-05-23 ENCOUNTER — TELEPHONE (OUTPATIENT)
Dept: PRIMARY CARE CLINIC | Facility: CLINIC | Age: 74
End: 2024-05-23
Payer: MEDICARE

## 2024-05-23 DIAGNOSIS — E03.9 ACQUIRED HYPOTHYROIDISM: Chronic | ICD-10-CM

## 2024-05-23 RX ORDER — LEVOTHYROXINE SODIUM 88 UG/1
88 TABLET ORAL
Qty: 90 TABLET | Refills: 3 | Status: SHIPPED | OUTPATIENT
Start: 2024-05-23 | End: 2025-05-23

## 2024-05-23 NOTE — TELEPHONE ENCOUNTER
----- Message from Leona Diaz sent at 5/23/2024  8:05 AM CDT -----  Regarding: refill  .Type:  RX Refill Request    Who Called: pt  Refill or New Rx:refill  RX Name and Strength:levothyroxine (SYNTHROID) 88 MCG tablet  How is the patient currently taking it? (ex. 1XDay):Take 1 tablet (88 mcg total) by mouth before breakfast.   Is this a 30 day or 90 day RX:90  Preferred Pharm[facility] with phone number[id number] [place] [facility] [address]  [place] or [facility] Order[facility]  Ordering Provider:  Would the patient rather a call back or a response via [facility]? no  Best Call Back Number:  Additional Information:

## 2024-05-23 NOTE — TELEPHONE ENCOUNTER
----- Message from Leona Diaz sent at 5/23/2024  8:13 AM CDT -----  Regarding: sooner miriam  .Type:  Sooner Appointment Request    Caller is requesting a sooner appointment.  Caller declined first available appointment listed below.  Caller will not accept being placed on the waitlist and is requesting a message be sent to doctor.  Name of Caller:pt  When is the first available appointment?6/21  Symptoms:  angiogram - [facility] 5/10/24  blockage found -  5/19 diagnosis with pneumonia      Would the patient rather a call back or a response via [facility]?   Best Call Back Number: [phone]  Additional Information:

## 2024-05-29 PROBLEM — J18.9 PNEUMONIA OF LEFT LOWER LOBE DUE TO INFECTIOUS ORGANISM: Status: ACTIVE | Noted: 2024-05-29

## 2024-05-29 PROBLEM — Z09 HOSPITAL DISCHARGE FOLLOW-UP: Status: ACTIVE | Noted: 2024-05-29

## 2024-05-29 NOTE — PROGRESS NOTES
Subjective:       Patient ID: Steven Xavier is a 73 y.o. male.    Chief Complaint: Hospital Follow Up    Established patient, presents to the clinic after a recent hospitalization starting about 10-11 days ago when he was diagnosed with pneumonia with the following HPI:    73-year-old male history of COPD CAD diabetes GERD hypertension hyperlipidemia sleep apnea hypothyroid CAD with MI, stent x 6 with complaints shortness of breath, wheezing, cough productive of greenish sputum exertional dyspnea, states he always has shortness of breath and exertional dyspnea but seems to worsen over the last few days and subjective fever and chills no documented temperature over the last 2 days. Recently had angiogram 1 week ago with nonobstructive vessels no further stents. Denies any chest pain or abdominal pain no nausea or vomiting, he has had a couple episodes of loose watery stool. Grandson had similar symptoms about 2 weeks ago      Review of Systems   Constitutional: Negative.  Negative for fatigue and fever.   HENT: Negative.  Negative for sore throat and trouble swallowing.    Eyes: Negative.  Negative for redness and visual disturbance.   Respiratory: Negative.  Negative for chest tightness and shortness of breath.    Cardiovascular: Negative.  Negative for chest pain.   Gastrointestinal: Negative.  Negative for abdominal pain, blood in stool and nausea.   Endocrine: Negative.  Negative for cold intolerance, heat intolerance and polyuria.   Genitourinary: Negative.    Musculoskeletal: Negative.  Negative for arthralgias, gait problem and joint swelling.   Integumentary:  Negative for rash. Negative.   Neurological: Negative.  Negative for dizziness, weakness and headaches.   Psychiatric/Behavioral: Negative.  Negative for agitation and dysphoric mood.            Patient Care Team:  Jitendra Garvin MD as PCP - General (Family Medicine)  Oswaldo Brown MD as Consulting Physician (Cardiology)  Ilir Haines LPN as Care  "Coordinator  Objective:   Visit Vitals  /76   Pulse 60   Resp 18   Ht 5' 5" (1.651 m)   Wt 100.7 kg (222 lb)   SpO2 98%   BMI 36.94 kg/m²        Physical Exam  Constitutional:       Appearance: He is obese.   HENT:      Head: Normocephalic.      Mouth/Throat:      Mouth: Mucous membranes are moist.      Pharynx: Oropharynx is clear.   Eyes:      Pupils: Pupils are equal, round, and reactive to light.   Cardiovascular:      Rate and Rhythm: Normal rate and regular rhythm.   Pulmonary:      Effort: Pulmonary effort is normal.      Breath sounds: Normal breath sounds.   Abdominal:      Palpations: Abdomen is soft.   Musculoskeletal:         General: Normal range of motion.   Skin:     General: Skin is warm and dry.   Neurological:      General: No focal deficit present.      Mental Status: He is alert.   Psychiatric:         Mood and Affect: Mood normal.         Behavior: Behavior normal.         Thought Content: Thought content normal.         Judgment: Judgment normal.           Lab Results   Component Value Date     10/26/2023    K 4.3 10/26/2023     02/26/2022    CO2 25 10/26/2023    BUN 19.9 10/26/2023    CREATININE 0.87 10/26/2023    CALCIUM 9.6 10/26/2023    ALBUMIN 4.1 10/26/2023    BILITOT 0.4 10/26/2023    ALKPHOS 92 10/26/2023    AST 31 10/26/2023    ALT 32 10/26/2023    ANIONGAP 9 02/26/2022    ESTGFRAFRICA 107 02/26/2022    EGFRNORACEVR >60 10/26/2023     Lab Results   Component Value Date    WBC 8.31 10/26/2023    RBC 4.78 10/26/2023    HGB 14.5 10/26/2023    HCT 42.3 10/26/2023    MCV 88.5 10/26/2023    RDW 13.7 10/26/2023     10/26/2023      Lab Results   Component Value Date    TSH 3.871 10/26/2023     Lab Results   Component Value Date    HGBA1C 5.6 10/26/2023          Assessment:       ICD-10-CM ICD-9-CM   1. Hospital discharge follow-up  Z09 V67.59   2. Pneumonia of left lower lobe due to infectious organism  J18.9 486       Current Outpatient Medications   Medication " Instructions    albuterol (PROVENTIL/VENTOLIN HFA) 90 mcg/actuation inhaler Inhalation    aspirin (ECOTRIN) 81 mg, Oral    clopidogreL (PLAVIX) 75 mg, Oral, Daily    hydroCHLOROthiazide (HYDRODIURIL) 12.5 mg, Oral, Daily    levothyroxine (SYNTHROID) 88 mcg, Oral, Before breakfast    metoprolol tartrate (LOPRESSOR) 25 mg, Oral, Daily    mv-mn/iron/folic acid/herb 190 (VITAMIN D3 COMPLETE ORAL) Oral    nitroGLYCERIN (NITROSTAT) 0.4 mg, Sublingual, Every 5 min PRN    NORVASC 2.5 mg, Oral, Nightly    pravastatin (PRAVACHOL) 40 mg, Oral, Daily    sertraline (ZOLOFT) 50 mg, Oral, Daily    sildenafiL (VIAGRA) 100 mg, Oral, Daily PRN    vitamin D (VITAMIN D3) 5,000 Units, Oral    zinc sulfate (ZINCATE) 50 mg, Oral     Plan:     Problem List Items Addressed This Visit          Pulmonary    Pneumonia of left lower lobe due to infectious organism    Overview     Patient recently treated for pneumonia with Rocephin and oral antibiotics.  Coughing has improved, takes over-the-counter DayQuil.    Appears to have improved, normal lung exam, normal oxygen saturation, so some mild dyspnea and weakness, but improving.            Other    Hospital discharge follow-up - Primary    Overview     Is doing well after his emergency room evaluation and treatment.                    Follow up for next appointment as scheduled or as needed.

## 2024-05-30 ENCOUNTER — OFFICE VISIT (OUTPATIENT)
Dept: PRIMARY CARE CLINIC | Facility: CLINIC | Age: 74
End: 2024-05-30
Payer: MEDICARE

## 2024-05-30 VITALS
RESPIRATION RATE: 18 BRPM | WEIGHT: 222 LBS | OXYGEN SATURATION: 98 % | BODY MASS INDEX: 36.99 KG/M2 | DIASTOLIC BLOOD PRESSURE: 76 MMHG | HEIGHT: 65 IN | SYSTOLIC BLOOD PRESSURE: 134 MMHG | HEART RATE: 60 BPM

## 2024-05-30 DIAGNOSIS — J18.9 PNEUMONIA OF LEFT LOWER LOBE DUE TO INFECTIOUS ORGANISM: ICD-10-CM

## 2024-05-30 DIAGNOSIS — Z09 HOSPITAL DISCHARGE FOLLOW-UP: Primary | ICD-10-CM

## 2024-05-30 PROCEDURE — 3075F SYST BP GE 130 - 139MM HG: CPT | Mod: CPTII,,, | Performed by: GENERAL PRACTICE

## 2024-05-30 PROCEDURE — 1160F RVW MEDS BY RX/DR IN RCRD: CPT | Mod: CPTII,,, | Performed by: GENERAL PRACTICE

## 2024-05-30 PROCEDURE — 99213 OFFICE O/P EST LOW 20 MIN: CPT | Mod: ,,, | Performed by: GENERAL PRACTICE

## 2024-05-30 PROCEDURE — 3078F DIAST BP <80 MM HG: CPT | Mod: CPTII,,, | Performed by: GENERAL PRACTICE

## 2024-05-30 PROCEDURE — 1159F MED LIST DOCD IN RCRD: CPT | Mod: CPTII,,, | Performed by: GENERAL PRACTICE

## 2024-08-31 NOTE — ASSESSMENT & PLAN NOTE
Component Ref Range & Units 6 d ago 7 mo ago 1 yr ago 2 yr ago   Hemoglobin A1c <=7.0 % 5.6  6.0  6.0  5.6 R    Estimated Average Glucose mg/dL 114.0  125.5  125.5  114.0      Most recent hemoglobin A1c continues to be in or below the prediabetes range.  Continue current treatment plan diet, lifestyle modification.  While it is less than optimal for blood sugars to remain in the prediabetes range, it is essential that blood sugars do not rise to the point of becoming a type II diabetic.  We will continue to monitor closely.   Ambulatory

## 2024-09-02 PROBLEM — J18.9 PNEUMONIA OF LEFT LOWER LOBE DUE TO INFECTIOUS ORGANISM: Status: RESOLVED | Noted: 2024-05-29 | Resolved: 2024-09-02

## 2024-09-02 PROBLEM — Z09 HOSPITAL DISCHARGE FOLLOW-UP: Status: RESOLVED | Noted: 2024-05-29 | Resolved: 2024-09-02

## 2024-10-24 DIAGNOSIS — R73.03 PREDIABETES: Primary | ICD-10-CM

## 2024-10-31 ENCOUNTER — PATIENT OUTREACH (OUTPATIENT)
Facility: CLINIC | Age: 74
End: 2024-10-31
Payer: MEDICARE

## 2024-11-05 ENCOUNTER — CLINICAL SUPPORT (OUTPATIENT)
Dept: PRIMARY CARE CLINIC | Facility: CLINIC | Age: 74
End: 2024-11-05
Payer: MEDICARE

## 2024-11-05 DIAGNOSIS — N52.8 OTHER MALE ERECTILE DYSFUNCTION: Primary | Chronic | ICD-10-CM

## 2024-11-05 DIAGNOSIS — R73.03 PREDIABETES: ICD-10-CM

## 2024-11-05 DIAGNOSIS — E03.9 ACQUIRED HYPOTHYROIDISM: Chronic | ICD-10-CM

## 2024-11-05 DIAGNOSIS — Z00.00 MEDICARE ANNUAL WELLNESS VISIT, INITIAL: Primary | ICD-10-CM

## 2024-11-05 DIAGNOSIS — I10 PRIMARY HYPERTENSION: ICD-10-CM

## 2024-11-05 DIAGNOSIS — E78.5 DYSLIPIDEMIA: Chronic | ICD-10-CM

## 2024-11-05 PROBLEM — F33.42 RECURRENT MAJOR DEPRESSIVE DISORDER, IN FULL REMISSION: Chronic | Status: ACTIVE | Noted: 2024-11-05

## 2024-11-05 PROBLEM — F32.A DEPRESSIVE DISORDER: Chronic | Status: RESOLVED | Noted: 2022-10-26 | Resolved: 2024-11-05

## 2024-11-05 LAB
ALBUMIN SERPL-MCNC: 4 G/DL (ref 3.4–4.8)
ALBUMIN/GLOB SERPL: 1.3 RATIO (ref 1.1–2)
ALP SERPL-CCNC: 88 UNIT/L (ref 40–150)
ALT SERPL-CCNC: 20 UNIT/L (ref 0–55)
ANION GAP SERPL CALC-SCNC: 9 MEQ/L
AST SERPL-CCNC: 23 UNIT/L (ref 5–34)
BASOPHILS # BLD AUTO: 0.13 X10(3)/MCL
BASOPHILS NFR BLD AUTO: 1.5 %
BILIRUB SERPL-MCNC: 0.4 MG/DL
BUN SERPL-MCNC: 21.9 MG/DL (ref 8.4–25.7)
CALCIUM SERPL-MCNC: 9.9 MG/DL (ref 8.8–10)
CHLORIDE SERPL-SCNC: 109 MMOL/L (ref 98–107)
CHOLEST SERPL-MCNC: 184 MG/DL
CHOLEST/HDLC SERPL: 4 {RATIO} (ref 0–5)
CO2 SERPL-SCNC: 25 MMOL/L (ref 23–31)
CREAT SERPL-MCNC: 1.09 MG/DL (ref 0.72–1.25)
CREAT UR-MCNC: 68.7 MG/DL (ref 63–166)
CREAT/UREA NIT SERPL: 20
EOSINOPHIL # BLD AUTO: 0.3 X10(3)/MCL (ref 0–0.9)
EOSINOPHIL NFR BLD AUTO: 3.4 %
ERYTHROCYTE [DISTWIDTH] IN BLOOD BY AUTOMATED COUNT: 14 % (ref 11.5–17)
EST. AVERAGE GLUCOSE BLD GHB EST-MCNC: 119.8 MG/DL
GFR SERPLBLD CREATININE-BSD FMLA CKD-EPI: >60 ML/MIN/1.73/M2
GLOBULIN SER-MCNC: 3.2 GM/DL (ref 2.4–3.5)
GLUCOSE SERPL-MCNC: 120 MG/DL (ref 82–115)
HBA1C MFR BLD: 5.8 %
HCT VFR BLD AUTO: 40.7 % (ref 42–52)
HDLC SERPL-MCNC: 46 MG/DL (ref 35–60)
HGB BLD-MCNC: 13.9 G/DL (ref 14–18)
IMM GRANULOCYTES # BLD AUTO: 0.03 X10(3)/MCL (ref 0–0.04)
IMM GRANULOCYTES NFR BLD AUTO: 0.3 %
LDLC SERPL CALC-MCNC: 110 MG/DL (ref 50–140)
LYMPHOCYTES # BLD AUTO: 2.77 X10(3)/MCL (ref 0.6–4.6)
LYMPHOCYTES NFR BLD AUTO: 31.7 %
MCH RBC QN AUTO: 31.2 PG (ref 27–31)
MCHC RBC AUTO-ENTMCNC: 34.2 G/DL (ref 33–36)
MCV RBC AUTO: 91.3 FL (ref 80–94)
MICROALBUMIN UR-MCNC: 10 UG/ML
MICROALBUMIN/CREAT RATIO PNL UR: 14.6 MG/GM CR (ref 0–30)
MONOCYTES # BLD AUTO: 0.85 X10(3)/MCL (ref 0.1–1.3)
MONOCYTES NFR BLD AUTO: 9.7 %
NEUTROPHILS # BLD AUTO: 4.66 X10(3)/MCL (ref 2.1–9.2)
NEUTROPHILS NFR BLD AUTO: 53.4 %
NRBC BLD AUTO-RTO: 0 %
PLATELET # BLD AUTO: 275 X10(3)/MCL (ref 130–400)
PMV BLD AUTO: 10.5 FL (ref 7.4–10.4)
POTASSIUM SERPL-SCNC: 4.3 MMOL/L (ref 3.5–5.1)
PROT SERPL-MCNC: 7.2 GM/DL (ref 5.8–7.6)
RBC # BLD AUTO: 4.46 X10(6)/MCL (ref 4.7–6.1)
SODIUM SERPL-SCNC: 143 MMOL/L (ref 136–145)
TRIGL SERPL-MCNC: 139 MG/DL (ref 34–140)
TSH SERPL-ACNC: 5.99 UIU/ML (ref 0.35–4.94)
VLDLC SERPL CALC-MCNC: 28 MG/DL
WBC # BLD AUTO: 8.74 X10(3)/MCL (ref 4.5–11.5)

## 2024-11-05 PROCEDURE — 82043 UR ALBUMIN QUANTITATIVE: CPT | Performed by: GENERAL PRACTICE

## 2024-11-05 PROCEDURE — 36415 COLL VENOUS BLD VENIPUNCTURE: CPT

## 2024-11-05 PROCEDURE — 83036 HEMOGLOBIN GLYCOSYLATED A1C: CPT | Performed by: GENERAL PRACTICE

## 2024-11-05 PROCEDURE — 80061 LIPID PANEL: CPT | Performed by: GENERAL PRACTICE

## 2024-11-05 PROCEDURE — 36415 COLL VENOUS BLD VENIPUNCTURE: CPT | Mod: ,,, | Performed by: GENERAL PRACTICE

## 2024-11-05 PROCEDURE — 84443 ASSAY THYROID STIM HORMONE: CPT | Performed by: GENERAL PRACTICE

## 2024-11-05 PROCEDURE — 85025 COMPLETE CBC W/AUTO DIFF WBC: CPT | Performed by: GENERAL PRACTICE

## 2024-11-05 PROCEDURE — 80053 COMPREHEN METABOLIC PANEL: CPT | Performed by: GENERAL PRACTICE

## 2024-11-05 RX ORDER — SILDENAFIL 100 MG/1
100 TABLET, FILM COATED ORAL DAILY PRN
Qty: 5 TABLET | Refills: 11 | Status: SHIPPED | OUTPATIENT
Start: 2024-11-05 | End: 2024-11-05 | Stop reason: SDUPTHER

## 2024-11-05 RX ORDER — SILDENAFIL 100 MG/1
100 TABLET, FILM COATED ORAL DAILY PRN
Qty: 5 TABLET | Refills: 11 | Status: SHIPPED | OUTPATIENT
Start: 2024-11-05 | End: 2024-11-06 | Stop reason: SDUPTHER

## 2024-11-06 ENCOUNTER — OFFICE VISIT (OUTPATIENT)
Dept: PRIMARY CARE CLINIC | Facility: CLINIC | Age: 74
End: 2024-11-06
Payer: MEDICARE

## 2024-11-06 VITALS
HEART RATE: 60 BPM | BODY MASS INDEX: 36.82 KG/M2 | WEIGHT: 221 LBS | SYSTOLIC BLOOD PRESSURE: 134 MMHG | DIASTOLIC BLOOD PRESSURE: 72 MMHG | HEIGHT: 65 IN | RESPIRATION RATE: 18 BRPM | OXYGEN SATURATION: 98 %

## 2024-11-06 DIAGNOSIS — R73.03 PREDIABETES: ICD-10-CM

## 2024-11-06 DIAGNOSIS — I25.10 ARTERIOSCLEROSIS OF CORONARY ARTERY: Chronic | ICD-10-CM

## 2024-11-06 DIAGNOSIS — E78.5 DYSLIPIDEMIA: Chronic | ICD-10-CM

## 2024-11-06 DIAGNOSIS — N52.8 OTHER MALE ERECTILE DYSFUNCTION: Chronic | ICD-10-CM

## 2024-11-06 DIAGNOSIS — E03.9 ACQUIRED HYPOTHYROIDISM: Chronic | ICD-10-CM

## 2024-11-06 DIAGNOSIS — K21.9 GERD WITHOUT ESOPHAGITIS: ICD-10-CM

## 2024-11-06 DIAGNOSIS — I27.20 CHRONIC PULMONARY HYPERTENSION: Chronic | ICD-10-CM

## 2024-11-06 DIAGNOSIS — R06.09 DYSPNEA ON EXERTION: Chronic | ICD-10-CM

## 2024-11-06 DIAGNOSIS — Z12.5 SCREENING FOR PROSTATE CANCER: ICD-10-CM

## 2024-11-06 DIAGNOSIS — E66.812 CLASS 2 SEVERE OBESITY DUE TO EXCESS CALORIES WITH SERIOUS COMORBIDITY AND BODY MASS INDEX (BMI) OF 36.0 TO 36.9 IN ADULT: Chronic | ICD-10-CM

## 2024-11-06 DIAGNOSIS — I77.9 DISORDER OF ARTERIES AND ARTERIOLES, UNSPECIFIED: Chronic | ICD-10-CM

## 2024-11-06 DIAGNOSIS — F33.42 RECURRENT MAJOR DEPRESSIVE DISORDER, IN FULL REMISSION: Chronic | ICD-10-CM

## 2024-11-06 DIAGNOSIS — Z95.1 HISTORY OF CORONARY ARTERY BYPASS GRAFT: Chronic | ICD-10-CM

## 2024-11-06 DIAGNOSIS — G47.33 OBSTRUCTIVE SLEEP APNEA SYNDROME: Chronic | ICD-10-CM

## 2024-11-06 DIAGNOSIS — Z00.00 MEDICARE ANNUAL WELLNESS VISIT, SUBSEQUENT: Primary | ICD-10-CM

## 2024-11-06 DIAGNOSIS — I10 PRIMARY HYPERTENSION: Chronic | ICD-10-CM

## 2024-11-06 DIAGNOSIS — E66.01 CLASS 2 SEVERE OBESITY DUE TO EXCESS CALORIES WITH SERIOUS COMORBIDITY AND BODY MASS INDEX (BMI) OF 36.0 TO 36.9 IN ADULT: Chronic | ICD-10-CM

## 2024-11-06 DIAGNOSIS — Z95.5 PRESENCE OF STENT IN CORONARY ARTERY: Chronic | ICD-10-CM

## 2024-11-06 PROCEDURE — 1159F MED LIST DOCD IN RCRD: CPT | Mod: CPTII,,, | Performed by: GENERAL PRACTICE

## 2024-11-06 PROCEDURE — 1158F ADVNC CARE PLAN TLK DOCD: CPT | Mod: CPTII,,, | Performed by: GENERAL PRACTICE

## 2024-11-06 PROCEDURE — 3078F DIAST BP <80 MM HG: CPT | Mod: CPTII,,, | Performed by: GENERAL PRACTICE

## 2024-11-06 PROCEDURE — G0439 PPPS, SUBSEQ VISIT: HCPCS | Mod: ,,, | Performed by: GENERAL PRACTICE

## 2024-11-06 PROCEDURE — 3075F SYST BP GE 130 - 139MM HG: CPT | Mod: CPTII,,, | Performed by: GENERAL PRACTICE

## 2024-11-06 PROCEDURE — 1160F RVW MEDS BY RX/DR IN RCRD: CPT | Mod: CPTII,,, | Performed by: GENERAL PRACTICE

## 2024-11-06 RX ORDER — SERTRALINE HYDROCHLORIDE 50 MG/1
50 TABLET, FILM COATED ORAL DAILY
Qty: 90 TABLET | Refills: 3 | Status: SHIPPED | OUTPATIENT
Start: 2024-11-06 | End: 2025-11-06

## 2024-11-06 RX ORDER — SILDENAFIL 100 MG/1
100 TABLET, FILM COATED ORAL DAILY PRN
Qty: 5 TABLET | Refills: 11 | Status: SHIPPED | OUTPATIENT
Start: 2024-11-06 | End: 2025-11-06

## 2024-11-06 RX ORDER — OMEPRAZOLE 20 MG/1
20 CAPSULE, DELAYED RELEASE ORAL DAILY
Qty: 90 CAPSULE | Refills: 3 | Status: SHIPPED | OUTPATIENT
Start: 2024-11-06 | End: 2025-11-06

## 2024-11-06 NOTE — PROGRESS NOTES
Patient ID: [id number]     Chief Complaint: Annual Exam and Shortness of Breath      HPI:     Steven Xavier is a 74 y.o. male here today for a Medicare Wellness. No other complaints today.       -------------------------------------    Anemia    Anemia    History of coronary angiogram    Hyperlipidemia    Hypertension    MEGAN (obstructive sleep apnea)    Thyroid disease        No past surgical history on file.    Review of patient's allergies indicates:  No Known Allergies    Outpatient Medications Marked as Taking for the 11/6/24 encounter (Office Visit) with Jitendra Garvin MD   Medication Sig Dispense Refill    albuterol (PROVENTIL/VENTOLIN HFA) 90 mcg/actuation inhaler Inhale into the lungs.      aspirin (ECOTRIN) 81 MG EC tablet Take 81 mg by mouth.      clopidogreL (PLAVIX) 75 mg tablet Take 75 mg by mouth once daily.      hydroCHLOROthiazide (HYDRODIURIL) 12.5 MG Tab Take 1 tablet (12.5 mg total) by mouth once daily. 90 tablet 3    levothyroxine (SYNTHROID) 88 MCG tablet Take 1 tablet (88 mcg total) by mouth before breakfast. 90 tablet 3    metoprolol tartrate (LOPRESSOR) 25 MG tablet Take 25 mg by mouth once daily.      mv-mn/iron/folic acid/herb 190 (VITAMIN D3 COMPLETE ORAL) Take by mouth.      nitroGLYCERIN (NITROSTAT) 0.4 MG SL tablet Place 1 tablet (0.4 mg total) under the tongue every 5 (five) minutes as needed for Chest pain. 30 tablet 11    NORVASC 2.5 mg tablet Take 2.5 mg by mouth every evening.      pravastatin (PRAVACHOL) 40 MG tablet Take 1 tablet (40 mg total) by mouth once daily. 90 tablet 3    vitamin D (VITAMIN D3) 1000 units Tab Take 5,000 Units by mouth.      [DISCONTINUED] sildenafiL (VIAGRA) 100 MG tablet Take 1 tablet (100 mg total) by mouth daily as needed for Erectile Dysfunction (May take 1/2 tablet if effective). 5 tablet 11       Social History     Socioeconomic History    Marital status:    Tobacco Use    Smoking status: Former     Current packs/day: 1.00     Average  packs/day: 0.7 packs/day for 58.4 years (38.4 ttl pk-yrs)     Types: Cigarettes     Start date: 6/10/1966    Smokeless tobacco: Never        No family history on file.     Patient Care Team:  Jitendra Garvin MD as PCP - General (Family Medicine)  Oswaldo Brown MD as Consulting Physician (Cardiology)  Ilir Haines LPN as Care Coordinator  [facility]     Opioid Screening: Patient medication list reviewed, patient is not taking prescription opioids. Patient is at low risk of substance abuse based on this opioid use history.       Subjective:     Review of Systems   Constitutional: Negative.  Negative for malaise/fatigue and weight loss.   HENT: Negative.     Eyes: Negative.    Respiratory: Negative.  Negative for shortness of breath.    Cardiovascular: Negative.  Negative for chest pain.   Gastrointestinal: Negative.    Genitourinary: Negative.    Musculoskeletal: Negative.    Skin: Negative.    Neurological: Negative.  Negative for focal weakness, weakness and headaches.   Endo/Heme/Allergies: Negative.    Psychiatric/Behavioral: Negative.  Negative for depression and memory loss. The patient is not nervous/anxious.          Patient Reported Health Risk Assessment  What [age] 70-79  Are you male or female?: Male  During the past four weeks, how much have you been bothered by emotional problems such as feeling anxious, depressed, irritable, sad, or downhearted and blue?: Not at all  During the past five weeks, has your physical and/or emotional health limited your social activities with family, friends, neighbors, or groups?: Not at all  During the past four weeks, how much bodily pain have you generally had?: No pain  During the past four weeks, was someone available to help if you needed and wanted help?: Yes, as much as I wanted  During the past four weeks, what was the hardest physical activity you could do for at least two minutes?: Light  Can you get to places out of walking distance  without help?  (For example, can you travel alone on buses or taxis, or drive your own car?): Yes  Can you go shopping for groceries or clothes without someone's help?: Yes  Can you prepare your own meals?: Yes  Can you do your own housework without help?: Yes  Because of any health problems, do you need the help of another person with your personal care needs such as eating, bathing, dressing, or getting around the house?: No  Can you handle your own money without help?: Yes  During the past four weeks, how would you rate your health in general?: Good  How have things been going for you during the past four weeks?: Pretty well  Are you having difficulties driving your car?: No  Do you always fasten your seat belt when you are in a car?: Yes, usually  How often in the past four weeks have you been bothered by falling or dizzy when standing up?: Never  How often in the past four weeks have you been bothered by sexual problems?: Never  How often in the past four weeks have you been bothered by trouble eating well?: Never  How often in the past four weeks have you been bothered by teeth or denture problems?: Never  How often in the past four weeks have you been bothered with problems using the telephone?: Never  How often in the past four weeks have you been bothered by tiredness or fatigue?: Never  Have you fallen two or more times in the past year?: No  Are you afraid of falling?: No  Are you a smoker?: No  During the past four weeks, how many drinks of wine, beer, or other alcoholic beverages did you have?: One drink or less per week  Do you exercise for about 20 minutes three or more days a week?: No, I usually do not exercise this much  Have you been given any information to help you with hazards in your house that might hurt you?: No  Have you been given any information to help you with keeping track of your medications?: No  How often do you have trouble taking medicines the way you've been told to take them?: I  "always take them as prescribed  How confident are you that you can control and manage most of your health problems?: Very confident  What is your race? (Check all that apply.):     Objective:     /72   Pulse 60   Resp 18   Ht 5' 5" (1.651 m)   Wt 100.2 kg (221 lb)   SpO2 98%   BMI 36.78 kg/m²     Physical Exam  Constitutional:       Appearance: Normal appearance.   HENT:      Head: Normocephalic.      Mouth/Throat:      Mouth: Mucous membranes are moist.      Pharynx: Oropharynx is clear.   Eyes:      Conjunctiva/sclera: Conjunctivae normal.      Pupils: Pupils are equal, round, and reactive to light.   Cardiovascular:      Rate and Rhythm: Normal rate and regular rhythm.      Heart sounds: Normal heart sounds.   Pulmonary:      Effort: Pulmonary effort is normal.      Breath sounds: Normal breath sounds.   Abdominal:      General: Abdomen is flat.      Palpations: Abdomen is soft.   Musculoskeletal:         General: Normal range of motion.      Cervical back: Neck supple.   Skin:     General: Skin is warm and dry.   Neurological:      General: No focal deficit present.      Mental Status: He is alert and oriented to person, place, and time.   Psychiatric:         Mood and Affect: Mood normal.         Behavior: Behavior normal.         Thought Content: Thought content normal.         Judgment: Judgment normal.         Lab Results   Component Value Date     11/05/2024    K 4.3 11/05/2024     (H) 11/05/2024    CO2 25 11/05/2024    BUN 21.9 11/05/2024    CREATININE 1.09 11/05/2024    CALCIUM 9.9 11/05/2024    ALBUMIN 4.0 11/05/2024    BILITOT 0.4 11/05/2024    ALKPHOS 88 11/05/2024    AST 23 11/05/2024    ALT 20 11/05/2024    ANIONGAP 9 02/26/2022    ESTGFRAFRICA 107 02/26/2022    EGFRNORACEVR >60 11/05/2024     Lab Results   Component Value Date    WBC 8.74 11/05/2024    RBC 4.46 (L) 11/05/2024    HGB 13.9 (L) 11/05/2024    HCT 40.7 (L) 11/05/2024    MCV 91.3 11/05/2024    RDW 14.0 " 11/05/2024     11/05/2024      Lab Results   Component Value Date    CHOL 184 11/05/2024    TRIG 139 11/05/2024    HDL 46 11/05/2024    .00 11/05/2024    TOTALCHOLEST 4 11/05/2024     Lab Results   Component Value Date    TSH 5.987 (H) 11/05/2024     Lab Results   Component Value Date    HGBA1C 5.8 11/05/2024        Lab Results   Component Value Date    PSA 1.19 10/24/2022              No data to display                  11/1/2023     2:30 PM 10/27/2022     8:30 AM   Fall Risk Assessment - Outpatient   Mobility Status Ambulatory Ambulatory   Number of falls 0 0   Identified as fall risk False False           Depression Screening  Over the past two weeks, has the patient felt down, depressed, or hopeless?: No  Over the past two weeks, has the patient felt little interest or pleasure in doing things?: No  Functional Ability/Safety Screening  Was the patient's timed Up & Go test unsteady or longer than 30 seconds?: No  Does the patient need help with phone, transportation, shopping, preparing meals, housework, laundry, meds, or managing money?: No  Does the patient's home have rugs in the hallway, lack grab bars in the bathroom, lack handrails on the stairs or have poor lighting?: No  Have you noticed any hearing difficulties?: No  Cognitive Function (Assessed through direct observation with due consideration of information obtained by way of patient reports and/or concerns raised by family, friends, caretakers, or others)    Does the patient repeat questions/statements in the same day?: No  Does the patient have trouble remembering the date, year, and time?: No  Does the patient have difficulty managing finances?: No  Does the patient have a decreased sense of direction?: No  Assessment:       ICD-10-CM ICD-9-CM   1. Medicare annual wellness visit, subsequent  Z00.00 V70.0   2. Screening for prostate cancer  Z12.5 V76.44   3. Primary hypertension  I10 401.9   4. Dyslipidemia  E78.5 272.4   5.  Arteriosclerosis of coronary artery  I25.10 414.00   6. Class 2 severe obesity due to excess calories with serious comorbidity and body mass index (BMI) of 36.0 to 36.9 in adult  E66.812 278.01    E66.01 V85.36    Z68.36    7. Chronic pulmonary hypertension  I27.20 416.8   8. Disorder of arteries and arterioles, unspecified  I77.9 447.9   9. Acquired hypothyroidism  E03.9 244.9   10. Prediabetes  R73.03 790.29   11. Recurrent major depressive disorder, in full remission  F33.42 296.36   12. History of coronary artery bypass graft  Z95.1 V45.81   13. Presence of stent in coronary artery  Z95.5 V45.82   14. Obstructive sleep apnea syndrome  G47.33 327.23   15. Other male erectile dysfunction  N52.8 607.84   16. GERD without esophagitis  K21.9 530.81   17. Dyspnea on exertion  R06.09 786.09        Plan:     Medicare Annual Wellness and Personalized Prevention Plan:   Fall Risk + Home Safety + Hearing Impairment + Depression Screen + Cognitive Impairment Screen + Health Risk Assessment all reviewed.       Health Maintenance Topics with due status: Not Due       Topic Last Completion Date    Aspirin/Antiplatelet Therapy 05/30/2024    Diabetes Urine Screening 11/05/2024    Lipid Panel 11/05/2024    Hemoglobin A1c 11/05/2024      The patient's Health Maintenance was reviewed and the following appears to be due at this time:   Health Maintenance Due   Topic Date Due    Eye Exam  03/21/2024     Health risk assessment:  After review of the patient's medical record as it relates to health risk assessment over the next 5-10 years per recommendations of the USPSTF, it was determined that all pertinent recommendations have been appropriately addressed. The patient does not desire any further preventative care measures or screening tests at this time.  We will continue to reassess at each annual visit.    1. Medicare annual wellness visit, subsequent  Comments:  Overall health status was reviewed.  Good health habits reinforced.   Annual wellness exams recommended.    2. Screening for prostate cancer  Comments:  Prostate specific antigen blood test obtained was essentially normal, suggesting that there is no current concern for prostate cancer.  Digital exam deferred.    3. Primary hypertension  Overview:  Prescribed Lopressor 25 mg daily, hydrochlorothiazide 12.5 mg daily.    Blood pressures appear to be adequately controlled with current treatment plan, including prescription blood pressure medication.  Medication(s) appear to be effective at current dosages, and are not causing any side effects or problems.  Continue medical management and continue home blood pressure checks.  Average blood pressures at home should be no greater than 130/80.  Patient instructed to contact the clinic if blood pressures become elevated at any point prior to next clinic visit.    Medication will be continued at the current dosage.      4. Dyslipidemia  Overview:  Prescribed pravastatin 40 mg daily.    Most recent cholesterol/lipid blood testing shows adequate control, continue current treatment plan, including prescription medications if prescribed, and/or diet high in fiber, low in saturated fats as discussed during clinic visit.    Medication will be continued at current dosage.      5. Arteriosclerosis of coronary artery  Overview:  Prescribed nitroglycerin that he does not use very often, understands not to use it with his Viagra.    Sees Cardiology, cardiac catheterization scheduled for 05/10/2024.  Two-vessel CABG 1990, six cardiac stents.      6. Class 2 severe obesity due to excess calories with serious comorbidity and body mass index (BMI) of 36.0 to 36.9 in adult  Overview:  Patient continues to have a BMI that falls in the obesity range.    Weight loss, healthy dietary choices, increased activity/exercise are recommended.  To lose weight, it is generally recommended to restrict calories to approximately 1500 calories per day to lose 1 lb per week,  "and approximately 1200 calories per day to lose up to 2 lb per week.  Generally, this is a healthy amount of weight to lose, and an appropriate amount of time to lose this amount of weight.  Adding exercise will assist in losing weight, particularly aerobic exercise such as walking.  However, resistance training, or lifting weights" over time may assistant weight loss by increasing basal metabolic rate, or the rate at which your body burns calories during periods of inactivity.    Keep track of BMI (body mass index), below 25 is considered normal, over 25 but below 30 is considered overweight, anything over 30 is considered moderately obese, over 35 severely obese, and over 40 is characterized as morbidly obese.      7. Chronic pulmonary hypertension  Overview:  From echo February 2022:  · Normal left ventricle cavity size. Normal (55-65%) ejection fraction.   Mild concentric hypertrophy observed.   · Mild mitral regurgitation   · Mild pulmonary hypertension present.     Left Ventricle   Normal left ventricle cavity size. Normal (55-65%) ejection fraction. Mild concentric hypertrophy observed.     Sees Cardiology, Dr. Issa, scheduled for a cardiac catheterization next week, CABG 1990, two-vessel, six stents, followed closely by Cardiology, appears to be doing well.      8. Disorder of arteries and arterioles, unspecified  Overview:  Patient does have atherosclerosis, sees Dr. Brown, regular follow-up, condition appears to be stable.    Cardiac catheterization scheduled for 05/10/2024.      9. Acquired hypothyroidism  Overview:  Prescribed levothyroxine 88 mcg daily.    Most recent TSH 5.987, slightly elevated.  Feels fine, no symptoms which would suggest decompensated thyroid disease, continue current Synthroid dose.        Assessment & Plan:           Component Ref Range & Units 07:11  (11/5/24) 1 yr ago  (10/26/23) 1 yr ago  (3/30/23) 2 yr ago  (10/24/22) 3 yr ago  (10/14/21)   TSH 0.350 - 4.940 uIU/mL 5.987 " High  3.871 9.330 High  6.0245 High  R 1.7935 R            10. Prediabetes  Overview:  Patient has prediabetes and is not prescribed medication.  Patient's prediabetes is treated and controlled adequately using conservative means, specifically lifestyle modification, dietary changes, and/or increase in activity/exercise.    Assessment & Plan:           Component Ref Range & Units 07:11  (11/5/24) 1 yr ago  (10/26/23) 1 yr ago  (3/30/23) 2 yr ago  (10/24/22) 3 yr ago  (10/14/21)   Hemoglobin A1c <=7.0 % 5.8 5.6 6.0 6.0 5.6 R   Estimated Average Glucose mg/dL 119.8 114.0 125.5 125.5 114.0            11. Recurrent major depressive disorder, in full remission  Overview:  Prescribed Zoloft 50 mg daily.    Patient reports stability of moods, and effectiveness of medications, including no agitation, significant somnolence, or significant bouts of anxiety or depression.  Patient is not having any sleep disturbance.  Current treatment plan will continue, with plans to discuss any significant changes in patient's status if necessary if anything changes in the future..    Medication will be continued at current dosage.      12. History of coronary artery bypass graft  Overview:  History of two vessel CABG, 1990.    Orders:  -     sertraline (ZOLOFT) 50 MG tablet; Take 1 tablet (50 mg total) by mouth once daily.  Dispense: 90 tablet; Refill: 3    13. Presence of stent in coronary artery  Overview:  Sees Cardiology, Dr. Brown.  Has six cardiac stents.    Cardiac condition has been stable, continue follow-up with Cardiology.      14. Obstructive sleep apnea syndrome  Overview:  Patient's sleep apnea condition is stable, continue current treatment plan.      15. Other male erectile dysfunction  Overview:  Prescribed sildenafil 100 mg, use 1/2 tablet as needed.    Patient takes medication as needed for erectile dysfunction, medication is effective, and does not cause any significant side effects.  He does understand not to take the  nitroglycerin with his Viagra.    Orders:  -     sildenafiL (VIAGRA) 100 MG tablet; Take 1 tablet (100 mg total) by mouth daily as needed for Erectile Dysfunction (May take 1/2 tablet if effective).  Dispense: 5 tablet; Refill: 11    16. GERD without esophagitis  -     omeprazole (PRILOSEC) 20 MG capsule; Take 1 capsule (20 mg total) by mouth once daily.  Dispense: 90 capsule; Refill: 3    17. Dyspnea on exertion  Overview:  As of 11/06/2024, dyspnea seems to be worsening, he will speak to his cardiologist at an upcoming visit in a few weeks.  Last cardiac catheterization about six months ago, 100% LAD treated successfully.              Advance Care Planning     Date: 11/05/2024    Living Will  During this visit, I engaged the patient  in the voluntary advance care planning process.  The patient and I reviewed the role for advance directives and their purpose in directing future healthcare if the patient's unable to speak for him/herself.  At this point in time, the patient does have full decision-making capacity.  We discussed different extreme health states that he could experience, and reviewed what kind of medical care he would want in those situations.  The patient communicated that if he were comatose and had little chance of a meaningful recovery, he would not want machines/life-sustaining treatments used. In addition to the above preference, other important end-of-life issues for the patient include no other issues.  Advanced care planning paperwork given to patient to bring home and discuss with the family.  Once signed, patient should bring form back for for the purposes of entering it into the medical record.  I spent a total of 5 minutes engaging the patient in this advance care planning discussion.              Current Outpatient Medications   Medication Instructions    albuterol (PROVENTIL/VENTOLIN HFA) 90 mcg/actuation inhaler Inhale into the lungs.    aspirin (ECOTRIN) 81 mg    clopidogreL (PLAVIX)  75 mg, Daily    hydroCHLOROthiazide (HYDRODIURIL) 12.5 mg, Oral, Daily    levothyroxine (SYNTHROID) 88 mcg, Oral, Before breakfast    metoprolol tartrate (LOPRESSOR) 25 mg, Daily    mv-mn/iron/folic acid/herb 190 (VITAMIN D3 COMPLETE ORAL) Take by mouth.    nitroGLYCERIN (NITROSTAT) 0.4 mg, Sublingual, Every 5 min PRN    NORVASC 2.5 mg, Nightly    omeprazole (PRILOSEC) 20 mg, Oral, Daily    pravastatin (PRAVACHOL) 40 mg, Oral, Daily    sertraline (ZOLOFT) 50 mg, Oral, Daily    sildenafiL (VIAGRA) 100 mg, Oral, Daily PRN    vitamin D (VITAMIN D3) 5,000 Units        Follow up in about 6 months (around 5/6/2025) for Extended chronic condition F/up. In addition to their scheduled follow up, the patient has also been instructed to follow up on as needed basis.     This note was created with the assistance of [employer] voice recognition software or phone dictation. There may be transcription errors as a result of using this technology. Effort has been made to assure accuracy of transcription but any obvious errors or omissions should be clarified with the author of the document.

## 2024-11-06 NOTE — ASSESSMENT & PLAN NOTE
Component Ref Range & Units 07:11  (11/5/24) 1 yr ago  (10/26/23) 1 yr ago  (3/30/23) 2 yr ago  (10/24/22) 3 yr ago  (10/14/21)   TSH 0.350 - 4.940 uIU/mL 5.987 High  3.871 9.330 High  6.0245 High  R 1.7935 R

## 2024-11-06 NOTE — ASSESSMENT & PLAN NOTE
Component Ref Range & Units 07:11  (11/5/24) 1 yr ago  (10/26/23) 1 yr ago  (3/30/23) 2 yr ago  (10/24/22) 3 yr ago  (10/14/21)   Hemoglobin A1c <=7.0 % 5.8 5.6 6.0 6.0 5.6 R   Estimated Average Glucose mg/dL 119.8 114.0 125.5 125.5 114.0

## 2024-12-17 ENCOUNTER — TELEPHONE (OUTPATIENT)
Dept: PRIMARY CARE CLINIC | Facility: CLINIC | Age: 74
End: 2024-12-17
Payer: MEDICARE

## 2024-12-17 NOTE — TELEPHONE ENCOUNTER
----- Message from Kydouganaly sent at 12/17/2024 10:07 AM CST -----  .Who Called: Steven Xavier    What order is the patient requesting: CT scan order for the polups on the lungs    When does the expect the orders to be performed? Anywhere with [facility]        Preferred Method of Contact: Phone Call  Patient's Preferred Phone Number on File: [phone]   Best Call Back Number, if different:  Additional Information: states that the cardiologist is requesting for orders to be put in for the CT scan

## 2025-01-06 ENCOUNTER — TELEPHONE (OUTPATIENT)
Dept: PRIMARY CARE CLINIC | Facility: CLINIC | Age: 75
End: 2025-01-06
Payer: MEDICARE

## 2025-01-06 DIAGNOSIS — E78.5 DYSLIPIDEMIA: Chronic | ICD-10-CM

## 2025-01-06 RX ORDER — PRAVASTATIN SODIUM 40 MG/1
40 TABLET ORAL DAILY
Qty: 90 TABLET | Refills: 3 | Status: SHIPPED | OUTPATIENT
Start: 2025-01-06 | End: 2026-01-06

## 2025-01-06 NOTE — TELEPHONE ENCOUNTER
----- Message from Shanell sent at 1/6/2025 10:48 AM CST -----  .Who Called: Steven Xavier    Refill or New Rx:Refill  RX Name and Strength:pravastatin (PRAVACHOL) 40 MG tablet  How is the patient currently taking it? (ex. 1XDay):1x day  Is this a 30 day or 90 day RX:90  Local or Mail Order:local  List of preferred pharmac[facility] on file (remove unneeded): [email or web address]  If different Pharmacy is requested, enter Pharmacy information here including location and phone number: same   Ordering Provider:Bharathi      Preferred Method of Contact: Phone Call  Patient's Preferred Phone Number on File: [phone]   Best Call Back Number, if different:  Additional Information: refills needed

## 2025-01-22 VITALS — DIASTOLIC BLOOD PRESSURE: 80 MMHG | SYSTOLIC BLOOD PRESSURE: 136 MMHG

## 2025-03-31 PROBLEM — R93.89 ABNORMAL CHEST X-RAY: Status: ACTIVE | Noted: 2025-03-31

## 2025-03-31 NOTE — PROGRESS NOTES
"Subjective:       Patient ID: Steven Xavier is a 74 y.o. male.    Chief Complaint: No chief complaint on file.    Established patient, presents to the clinic after he saw Dr. Brown in early March.  He had a previous CT scan in 2022 that showed a 7 mm pulmonary nodule, right lower lobe.  Presents today for scheduling of a repeat CT scan.  He has had multiple chest x-rays since the initial CT scan, none of which showed anything significant.  He is asymptomatic, no hemoptysis, no weight loss, no dyspnea or chest pain.      Review of Systems   Constitutional: Negative.  Negative for fatigue and fever.   HENT: Negative.  Negative for sore throat and trouble swallowing.    Eyes: Negative.  Negative for redness and visual disturbance.   Respiratory: Negative.  Negative for chest tightness and shortness of breath.    Cardiovascular: Negative.  Negative for chest pain.   Gastrointestinal: Negative.  Negative for abdominal pain, blood in stool and nausea.   Endocrine: Negative.  Negative for cold intolerance, heat intolerance and polyuria.   Genitourinary: Negative.    Musculoskeletal: Negative.  Negative for arthralgias, gait problem and joint swelling.   Integumentary:  Negative for rash. Negative.   Neurological: Negative.  Negative for dizziness, weakness and headaches.   Psychiatric/Behavioral: Negative.  Negative for agitation and dysphoric mood.            Patient Care Team:  Jitendra Garvin MD as PCP - General (Family Medicine)  Oswaldo Brown MD as Consulting Physician (Cardiology)  Ilir Haines LPN as Care Coordinator  [facility] [place] [facility] [facility]  Objective:   Visit Vitals  /76 (Patient Position: Sitting)   Pulse (!) 56   Ht 5' 5" (1.651 m)   Wt 101 kg (222 lb 9.6 oz)   BMI 37.04 kg/m²        Physical Exam  Constitutional:       Appearance: He is obese.   HENT:      Head: Normocephalic.      Mouth/Throat:      Mouth: Mucous membranes are moist.      Pharynx: Oropharynx is clear.   Eyes:      Pupils: Pupils " are equal, round, and reactive to light.   Cardiovascular:      Rate and Rhythm: Normal rate and regular rhythm.   Pulmonary:      Effort: Pulmonary effort is normal.      Breath sounds: Normal breath sounds.   Abdominal:      Palpations: Abdomen is soft.   Musculoskeletal:         General: Normal range of motion.   Skin:     General: Skin is warm and dry.   Neurological:      General: No focal deficit present.      Mental Status: He is alert.   Psychiatric:         Mood and Affect: Mood normal.         Behavior: Behavior normal.         Thought Content: Thought content normal.         Judgment: Judgment normal.           Lab Results   Component Value Date     11/05/2024    K 4.3 11/05/2024     (H) 11/05/2024    CO2 25 11/05/2024    BUN 21.9 11/05/2024    CREATININE 1.09 11/05/2024    CALCIUM 9.9 11/05/2024    ALBUMIN 4.0 11/05/2024    BILITOT 0.4 11/05/2024    ALKPHOS 88 11/05/2024    AST 23 11/05/2024    ALT 20 11/05/2024    ANIONGAP 9 02/26/2022    ESTGFRAFRICA 107 02/26/2022    EGFRNORACEVR >60 11/05/2024     Lab Results   Component Value Date    WBC 8.74 11/05/2024    RBC 4.46 (L) 11/05/2024    HGB 13.9 (L) 11/05/2024    HCT 40.7 (L) 11/05/2024    MCV 91.3 11/05/2024    RDW 14.0 11/05/2024     11/05/2024      Lab Results   Component Value Date    CHOL 184 11/05/2024    TRIG 139 11/05/2024    HDL 46 11/05/2024    .00 11/05/2024    TOTALCHOLEST 4 11/05/2024     Lab Results   Component Value Date    TSH 5.987 (H) 11/05/2024     Lab Results   Component Value Date    HGBA1C 5.8 11/05/2024        Lab Results   Component Value Date    PSA 1.19 10/24/2022         Assessment:       ICD-10-CM ICD-9-CM   1. Abnormal chest x-ray  R93.89 793.2   2. Primary hypertension  I10 401.9   3. Pulmonary nodule, right  R91.1 793.11   4. Solitary pulmonary nodule  R91.1 793.11       Current Outpatient Medications   Medication Instructions    albuterol (PROVENTIL/VENTOLIN HFA) 90 mcg/actuation inhaler Inhale  into the lungs.    aspirin (ECOTRIN) 81 mg    clopidogreL (PLAVIX) 75 mg, Daily    furosemide (LASIX) 40 mg, Daily    hydroCHLOROthiazide 12.5 mg, Oral, Daily    levothyroxine (SYNTHROID) 88 mcg, Oral, Before breakfast    metoprolol tartrate (LOPRESSOR) 25 mg, Daily    mv-mn/iron/folic acid/herb 190 (VITAMIN D3 COMPLETE ORAL) Take by mouth.    nitroGLYCERIN (NITROSTAT) 0.4 mg, Sublingual, Every 5 min PRN    NORVASC 2.5 mg, Nightly    omeprazole (PRILOSEC) 20 mg, Oral, Daily    pravastatin (PRAVACHOL) 40 mg, Oral, Daily    sertraline (ZOLOFT) 50 mg, Oral, Daily    sildenafiL (VIAGRA) 100 mg, Oral, Daily PRN    vitamin D (VITAMIN D3) 5,000 Units     Plan:     Problem List Items Addressed This Visit          Pulmonary    Abnormal chest x-ray - Primary    Pulmonary nodule, right    Overview   Per CT scan February 2022:      Impression    1. No evidence of pulmonary emboli or aortic dissection.  2. Scattered subpleural groundglass changes with basilar predominance, suggesting pneumonitis. No pleural effusion or pneumothorax. Mild cardiomegaly with right heart prominence.  3. 7 mm calcified nodule in the right lower lobe. Follow-up CT of the chest in 6-12 months as per the Fleischner criteria as provided above.         Current Assessment & Plan   CT of chest will be ordered as of 04/01/2025.            Cardiac/Vascular    Primary hypertension (Chronic)    Overview   Prescribed Lopressor 25 mg daily, hydrochlorothiazide 12.5 mg daily.    Blood pressures appear to be adequately controlled with current treatment plan, including prescription blood pressure medication.  Medication(s) appear to be effective at current dosages, and are not causing any side effects or problems.  Continue medical management and continue home blood pressure checks.  Average blood pressures at home should be no greater than 130/80.  Patient instructed to contact the clinic if blood pressures become elevated at any point prior to next clinic  visit.    Medication will be continued at the current dosage.         Relevant Medications    hydroCHLOROthiazide 12.5 MG Tab    Other Relevant Orders    Creatinine, serum     Other Visit Diagnoses         Solitary pulmonary nodule        Relevant Orders    CT Chest With Contrast    Creatinine, serum                    No follow-ups on file.    This note was created with the assistance of [employer] voice recognition software or phone dictation. There may be transcription errors as a result of using this technology. Effort has been made to assure accuracy of transcription but any obvious errors or omissions should be clarified with the author of the document.   left jaw pain

## 2025-04-01 ENCOUNTER — OFFICE VISIT (OUTPATIENT)
Dept: PRIMARY CARE CLINIC | Facility: CLINIC | Age: 75
End: 2025-04-01
Payer: MEDICARE

## 2025-04-01 VITALS
HEART RATE: 56 BPM | HEIGHT: 65 IN | BODY MASS INDEX: 37.09 KG/M2 | SYSTOLIC BLOOD PRESSURE: 134 MMHG | DIASTOLIC BLOOD PRESSURE: 76 MMHG | WEIGHT: 222.63 LBS

## 2025-04-01 DIAGNOSIS — I10 PRIMARY HYPERTENSION: Chronic | ICD-10-CM

## 2025-04-01 DIAGNOSIS — R91.1 SOLITARY PULMONARY NODULE: ICD-10-CM

## 2025-04-01 DIAGNOSIS — R91.1 PULMONARY NODULE, RIGHT: ICD-10-CM

## 2025-04-01 DIAGNOSIS — R93.89 ABNORMAL CHEST X-RAY: Primary | ICD-10-CM

## 2025-04-01 RX ORDER — HYDROCHLOROTHIAZIDE 12.5 MG/1
12.5 TABLET ORAL DAILY
Qty: 90 TABLET | Refills: 3 | Status: SHIPPED | OUTPATIENT
Start: 2025-04-01 | End: 2026-04-01

## 2025-04-01 RX ORDER — FUROSEMIDE 40 MG/1
40 TABLET ORAL DAILY
COMMUNITY

## 2025-04-11 ENCOUNTER — RESULTS FOLLOW-UP (OUTPATIENT)
Dept: PRIMARY CARE CLINIC | Facility: CLINIC | Age: 75
End: 2025-04-11

## 2025-04-22 ENCOUNTER — TELEPHONE (OUTPATIENT)
Dept: PRIMARY CARE CLINIC | Facility: CLINIC | Age: 75
End: 2025-04-22
Payer: MEDICARE

## 2025-04-22 NOTE — TELEPHONE ENCOUNTER
Patient is requesting Chest CT results. Scan completed on 4/8/25. Results of Chest CT faxed to Dr Brown's office 4/22/2025

## 2025-04-22 NOTE — TELEPHONE ENCOUNTER
----- Message from Maged sent at 4/22/2025  9:19 AM CDT -----  Who Called: Steven Christensen is requesting information on test results.Name of Test (Lab/Mammo/Etc): CT scanDate of Test: unknownWhere the test was performed: Imaging center [facility] Provider: PCPPreferred Method of Contact: Phone CallPatient's Preferred Phone Number on File: [phone] Best Call Back Number, if different:Additional Information: Pt is requesting results of CT scan. Also, wants them sent to cardiologist

## 2025-05-05 ENCOUNTER — TELEPHONE (OUTPATIENT)
Dept: PRIMARY CARE CLINIC | Facility: CLINIC | Age: 75
End: 2025-05-05
Payer: MEDICARE

## 2025-05-05 NOTE — TELEPHONE ENCOUNTER
Copied from CRM #[id number]. Topic: Appointments - Appointment Cancellation  >> May 5, 2025  1:37 PM Annmarie wrote:  [phone]

## 2025-05-05 NOTE — TELEPHONE ENCOUNTER
----- Message from Annmarie sent at 5/5/2025  1:47 PM CDT -----  Regarding: call back  .Who Called: Steven Christensen is requesting assistance/information from provider's office.Symptoms (please be specific):  How long has patient had these symptoms:  List of preferred pharmacies on file (remove unneeded): [unfilled]If different, enter pharmacy into here including location and phone number: Preferred Method of Contact: Phone CallPatient's Preferred Phone Number on File: [phone] Best Call Back Number, if different:Additional Information: pt requesting a call back to r/s a f/u appt  
Called and spoke to pt, tried to reschedule appt that was cancelled. Pt stated he is not home to look at his calendar.Pt will call back tomorrow to get rescheduled.  
ADMIT

## 2025-05-06 ENCOUNTER — TELEPHONE (OUTPATIENT)
Dept: PRIMARY CARE CLINIC | Facility: CLINIC | Age: 75
End: 2025-05-06
Payer: MEDICARE

## 2025-05-06 NOTE — TELEPHONE ENCOUNTER
Copied from CRM #[id number]. Topic: General Inquiry - Patient Advice  >> May 6, 2025  9:59 AM Sabrina wrote:  .Who Called: Steven Xavier    Patient is returning phone call    Who Left Message for Patient:na  Does the patient know what this is regarding?:na      Preferred Method of Contact: Phone Call  Patient's Preferred Phone Number on File: [phone]   Best Call Back Number, if different:  Additional Information: pt returning call to Geno

## 2025-05-06 NOTE — TELEPHONE ENCOUNTER
Called and spoke to pt about appt. Appt was made but pt had to hang up due to being at another appt

## 2025-05-07 ENCOUNTER — TELEPHONE (OUTPATIENT)
Dept: PRIMARY CARE CLINIC | Facility: CLINIC | Age: 75
End: 2025-05-07
Payer: MEDICARE

## 2025-05-07 ENCOUNTER — PATIENT OUTREACH (OUTPATIENT)
Facility: CLINIC | Age: 75
End: 2025-05-07
Payer: MEDICARE

## 2025-05-07 NOTE — TELEPHONE ENCOUNTER
Copied from CRM #[id number]. Topic: General Inquiry - Patient Advice  >> May 7, 2025  8:41 AM Sabrina wrote:  .Who Called: Steven Xavier    Patient is returning phone call    Who Left Message for Patient:na  Does the patient know what this is regarding?:na      Preferred Method of Contact: Phone Call  Patient's Preferred Phone Number on File: [phone]   Best Call Back Number, if different:  Additional Information: pt  calling for Geno

## 2025-06-10 NOTE — PROGRESS NOTES
"Subjective:       Patient ID: Steven Xavier is a 75 y.o. male.    Chief Complaint:  Chronic Condition Follow-up    Patient presents to the clinic today for chronic condition follow-up.  Doing well, no specific complaints, tolerating all medications, reporting no significant side effects or problems.     Last wellness exam was 11/06/2024.     Chronic conditions being treated include but are not limited to primary hypertension, prediabetes, dyslipidemia, history of STEMI/coronary artery disease, depression.        Review of Systems   Constitutional: Negative.  Negative for fatigue and fever.   HENT: Negative.  Negative for sore throat and trouble swallowing.    Eyes: Negative.  Negative for redness and visual disturbance.   Respiratory: Negative.  Negative for chest tightness and shortness of breath.    Cardiovascular: Negative.  Negative for chest pain.   Gastrointestinal: Negative.  Negative for abdominal pain, blood in stool and nausea.   Endocrine: Negative.  Negative for cold intolerance, heat intolerance and polyuria.   Genitourinary: Negative.    Musculoskeletal: Negative.  Negative for arthralgias, gait problem and joint swelling.   Integumentary:  Negative for rash. Negative.   Neurological: Negative.  Negative for dizziness, weakness and headaches.   Psychiatric/Behavioral: Negative.  Negative for agitation and dysphoric mood.            Patient Care Team:  Jitendra Garvin MD as PCP - General (Family Medicine)  Oswaldo Brown MD as Consulting Physician (Cardiology)  Ilir Haines LPN as Care Coordinator  [facility]  Objective:   Visit Vitals  /71   Pulse 60   Resp 18   Ht 5' 5" (1.651 m)   Wt 99.8 kg (220 lb)   SpO2 97%   BMI 36.61 kg/m²        Physical Exam  Constitutional:       Appearance: He is obese.   HENT:      Head: Normocephalic.      Mouth/Throat:      Mouth: Mucous membranes are moist.      Pharynx: Oropharynx is clear.   Eyes:      Pupils: Pupils are equal, round, and " reactive to light.   Cardiovascular:      Rate and Rhythm: Normal rate and regular rhythm.   Pulmonary:      Effort: Pulmonary effort is normal.      Breath sounds: Normal breath sounds.   Abdominal:      Palpations: Abdomen is soft.   Musculoskeletal:         General: Normal range of motion.   Skin:     General: Skin is warm and dry.   Neurological:      General: No focal deficit present.      Mental Status: He is alert.   Psychiatric:         Mood and Affect: Mood normal.         Behavior: Behavior normal.         Thought Content: Thought content normal.         Judgment: Judgment normal.           Lab Results   Component Value Date     (H) 11/05/2024     11/05/2024    K 4.3 11/05/2024     (H) 11/05/2024    CO2 25 11/05/2024    BUN 21.9 11/05/2024    CREATININE 1.09 11/05/2024    CALCIUM 9.9 11/05/2024    PROT 7.2 11/05/2024    ALBUMIN 4.0 11/05/2024    BILITOT 0.4 11/05/2024    ALKPHOS 88 11/05/2024    AST 23 11/05/2024    ALT 20 11/05/2024    ANIONGAP 9 02/26/2022    ESTGFRAFRICA 107 02/26/2022    EGFRNORACEVR >60 11/05/2024     Lab Results   Component Value Date    WBC 8.74 11/05/2024    RBC 4.46 (L) 11/05/2024    HGB 13.9 (L) 11/05/2024    HCT 40.7 (L) 11/05/2024    MCV 91.3 11/05/2024    RDW 14.0 11/05/2024     11/05/2024      Lab Results   Component Value Date    CHOL 184 11/05/2024    TRIG 139 11/05/2024    HDL 46 11/05/2024    .00 11/05/2024    TOTALCHOLEST 4 11/05/2024     Lab Results   Component Value Date    TSH 5.987 (H) 11/05/2024     Lab Results   Component Value Date    HGBA1C 5.8 11/05/2024        Lab Results   Component Value Date    PSA 1.19 10/24/2022         Assessment:       ICD-10-CM ICD-9-CM   1. Primary hypertension  I10 401.9   2. Prediabetes  R73.03 790.29   3. Dyslipidemia  E78.5 272.4   4. Acquired hypothyroidism  E03.9 244.9   5. Recurrent major depressive disorder, in full remission  F33.42 296.36   6. Pulmonary nodule, right  R91.1 793.11   7.  Arteriosclerosis of coronary artery  I25.10 414.00   8. Chronic pulmonary hypertension  I27.20 416.8   9. Disorder of arteries and arterioles, unspecified  I77.9 447.9   10. Other male erectile dysfunction  N52.8 607.84   11. Obstructive sleep apnea syndrome  G47.33 327.23   12. Class 2 severe obesity due to excess calories with serious comorbidity and body mass index (BMI) of 36.0 to 36.9 in adult  E66.812 278.01    E66.01 V85.36    Z68.36        Current Outpatient Medications   Medication Instructions    albuterol (PROVENTIL/VENTOLIN HFA) 90 mcg/actuation inhaler Inhale into the lungs.    aspirin (ECOTRIN) 81 mg    clopidogreL (PLAVIX) 75 mg, Daily    furosemide (LASIX) 40 mg, Daily    hydroCHLOROthiazide 12.5 mg, Oral, Daily    levothyroxine (SYNTHROID) 88 mcg, Oral, Before breakfast    metoprolol tartrate (LOPRESSOR) 25 mg, Daily    mv-mn/iron/folic acid/herb 190 (VITAMIN D3 COMPLETE ORAL) Take by mouth.    nitroGLYCERIN (NITROSTAT) 0.4 mg, Sublingual, Every 5 min PRN    NORVASC 2.5 mg, Nightly    omeprazole (PRILOSEC) 20 mg, Oral, Daily    pravastatin (PRAVACHOL) 40 mg, Oral, Daily    sertraline (ZOLOFT) 50 mg, Oral, Daily    sildenafiL (VIAGRA) 100 mg, Oral, Daily PRN    vitamin D (VITAMIN D3) 5,000 Units     Plan:     Problem List Items Addressed This Visit          Psychiatric    Recurrent major depressive disorder, in full remission (Chronic)    Overview   Prescribed Zoloft 50 mg daily.    Patient reports stability of moods, and effectiveness of medications, including no agitation, significant somnolence, or significant bouts of anxiety or depression.  Patient is not having any sleep disturbance.  Current treatment plan will continue, with plans to discuss any significant changes in patient's status if necessary if anything changes in the future..    Medication will be continued at current dosage.            Pulmonary    Pulmonary nodule, right    Overview   Per CT scan February 2022:      Impression    1.  No evidence of pulmonary emboli or aortic dissection.  2. Scattered subpleural groundglass changes with basilar predominance, suggesting pneumonitis. No pleural effusion or pneumothorax. Mild cardiomegaly with right heart prominence.  3. 7 mm calcified nodule in the right lower lobe. Follow-up CT of the chest in 6-12 months as per the Fleischner criteria as provided above.    CT scan April 2025:    Impression:     Stable small pulmonary nodules in the right lung base, the largest measuring 6 mm in diameter.     Evidence of prior granulomatous disease.     Surgical changes of CABG.     Cholelithiasis.              Cardiac/Vascular    Disorder of arteries and arterioles, unspecified (Chronic)    Overview   Patient does have atherosclerosis, sees Dr. Brown, regular follow-up, condition appears to be stable.    Cardiac catheterization scheduled for 05/10/2024.         Primary hypertension - Primary (Chronic)    Overview   Prescribed Lopressor 25 mg daily, hydrochlorothiazide 12.5 mg daily.    Blood pressures appear to be adequately controlled with current treatment plan, including prescription blood pressure medication.  Medication(s) appear to be effective at current dosages, and are not causing any side effects or problems.  Continue medical management and continue home blood pressure checks.  Average blood pressures at home should be no greater than 130/80.  Patient instructed to contact the clinic if blood pressures become elevated at any point prior to next clinic visit.    Medication will be continued at the current dosage.         Relevant Orders    Comprehensive Metabolic Panel    CBC Auto Differential    TSH    Dyslipidemia (Chronic)    Overview   Prescribed pravastatin 40 mg daily.    Most recent cholesterol/lipid blood testing shows adequate control, continue current treatment plan, including prescription medications if prescribed, and/or diet high in fiber, low in saturated fats as discussed during clinic  "visit.    Medication will be continued at current dosage.         Relevant Orders    Lipid Panel    Arteriosclerosis of coronary artery (Chronic)    Overview   Prescribed nitroglycerin that he does not use very often, understands not to use it with his Viagra.    Sees Cardiology, cardiac catheterization scheduled for 05/10/2024.  Two-vessel CABG 1990, six cardiac stents.         Chronic pulmonary hypertension (Chronic)    Overview   From echo February 2022:  · Normal left ventricle cavity size. Normal (55-65%) ejection fraction.   Mild concentric hypertrophy observed.   · Mild mitral regurgitation   · Mild pulmonary hypertension present.     Left Ventricle   Normal left ventricle cavity size. Normal (55-65%) ejection fraction. Mild concentric hypertrophy observed.     Sees Cardiology, Dr. Issa.            Renal/    Other male erectile dysfunction (Chronic)    Overview   Prescribed sildenafil 100 mg, use 1/2 tablet as needed.    Patient takes medication as needed for erectile dysfunction, medication is effective, and does not cause any significant side effects.  He does understand not to take the nitroglycerin with his Viagra.            Endocrine    Class 2 severe obesity due to excess calories with serious comorbidity and body mass index (BMI) of 36.0 to 36.9 in adult (Chronic)    Overview   Patient continues to have a BMI that falls in the obesity range.    Weight loss, healthy dietary choices, increased activity/exercise are recommended.  To lose weight, it is generally recommended to restrict calories to approximately 1500 calories per day to lose 1 lb per week, and approximately 1200 calories per day to lose up to 2 lb per week.  Generally, this is a healthy amount of weight to lose, and an appropriate amount of time to lose this amount of weight.  Adding exercise will assist in losing weight, particularly aerobic exercise such as walking.  However, resistance training, or lifting weights" over time may " assistant weight loss by increasing basal metabolic rate, or the rate at which your body burns calories during periods of inactivity.    Keep track of BMI (body mass index), below 25 is considered normal, over 25 but below 30 is considered overweight, anything over 30 is considered moderately obese, over 35 severely obese, and over 40 is characterized as morbidly obese.         Acquired hypothyroidism (Chronic)    Overview   Prescribed levothyroxine 88 mcg daily.    Most recent TSH 5.987, slightly elevated.  Feels fine, no symptoms which would suggest decompensated thyroid disease, continue current Synthroid dose.             Prediabetes    Overview   Patient has prediabetes and is not prescribed medication.  Patient's prediabetes is treated and controlled adequately using conservative means, specifically lifestyle modification, dietary changes, and/or increase in activity/exercise.         Relevant Orders    Hemoglobin A1C       Other    Obstructive sleep apnea syndrome (Chronic)    Overview   Patient's sleep apnea condition is stable, continue current treatment plan.                    Follow up in about 23 weeks (around 11/19/2025) for Medicare Wellness.    This note was created with the assistance of [employer] voice recognition software or phone dictation. There may be transcription errors as a result of using this technology. Effort has been made to assure accuracy of transcription but any obvious errors or omissions should be clarified with the author of the document.

## 2025-06-11 ENCOUNTER — OFFICE VISIT (OUTPATIENT)
Dept: PRIMARY CARE CLINIC | Facility: CLINIC | Age: 75
End: 2025-06-11
Payer: MEDICARE

## 2025-06-11 VITALS
BODY MASS INDEX: 36.65 KG/M2 | HEIGHT: 65 IN | OXYGEN SATURATION: 97 % | WEIGHT: 220 LBS | HEART RATE: 60 BPM | RESPIRATION RATE: 18 BRPM | SYSTOLIC BLOOD PRESSURE: 120 MMHG | DIASTOLIC BLOOD PRESSURE: 71 MMHG

## 2025-06-11 DIAGNOSIS — N52.8 OTHER MALE ERECTILE DYSFUNCTION: Chronic | ICD-10-CM

## 2025-06-11 DIAGNOSIS — E03.9 ACQUIRED HYPOTHYROIDISM: Chronic | ICD-10-CM

## 2025-06-11 DIAGNOSIS — I10 PRIMARY HYPERTENSION: Primary | Chronic | ICD-10-CM

## 2025-06-11 DIAGNOSIS — E66.01 CLASS 2 SEVERE OBESITY DUE TO EXCESS CALORIES WITH SERIOUS COMORBIDITY AND BODY MASS INDEX (BMI) OF 36.0 TO 36.9 IN ADULT: Chronic | ICD-10-CM

## 2025-06-11 DIAGNOSIS — E78.5 DYSLIPIDEMIA: Chronic | ICD-10-CM

## 2025-06-11 DIAGNOSIS — F33.42 RECURRENT MAJOR DEPRESSIVE DISORDER, IN FULL REMISSION: Chronic | ICD-10-CM

## 2025-06-11 DIAGNOSIS — G47.33 OBSTRUCTIVE SLEEP APNEA SYNDROME: Chronic | ICD-10-CM

## 2025-06-11 DIAGNOSIS — R91.1 PULMONARY NODULE, RIGHT: ICD-10-CM

## 2025-06-11 DIAGNOSIS — I25.10 ARTERIOSCLEROSIS OF CORONARY ARTERY: Chronic | ICD-10-CM

## 2025-06-11 DIAGNOSIS — I27.20 CHRONIC PULMONARY HYPERTENSION: Chronic | ICD-10-CM

## 2025-06-11 DIAGNOSIS — I77.9 DISORDER OF ARTERIES AND ARTERIOLES, UNSPECIFIED: Chronic | ICD-10-CM

## 2025-06-11 DIAGNOSIS — E66.812 CLASS 2 SEVERE OBESITY DUE TO EXCESS CALORIES WITH SERIOUS COMORBIDITY AND BODY MASS INDEX (BMI) OF 36.0 TO 36.9 IN ADULT: Chronic | ICD-10-CM

## 2025-06-11 DIAGNOSIS — R73.03 PREDIABETES: ICD-10-CM

## 2025-07-23 DIAGNOSIS — E03.9 ACQUIRED HYPOTHYROIDISM: Primary | Chronic | ICD-10-CM

## 2025-07-23 RX ORDER — LEVOTHYROXINE SODIUM 88 UG/1
88 TABLET ORAL
Qty: 90 TABLET | Refills: 3 | Status: SHIPPED | OUTPATIENT
Start: 2025-07-23 | End: 2026-07-23

## 2025-08-04 ENCOUNTER — TELEPHONE (OUTPATIENT)
Dept: PRIMARY CARE CLINIC | Facility: CLINIC | Age: 75
End: 2025-08-04
Payer: MEDICARE

## 2025-08-04 NOTE — TELEPHONE ENCOUNTER
"Copied from CRM #[id number]. Topic: General Inquiry - Patient Advice  >> Jul 31, 2025 10:45 AM Rosemarie wrote:  .Who Called: Steven J Duhon    Caller is requesting assistance/information from provider's office.    Symptoms (please be specific): right hand and upper arm goes "dead" no movement / flop over and since 4am today . Happening on the right arm   How long has patient had these symptoms:  3 weeks   List of preferred pharmacies on file (remove unneeded): [unfilled]  If different, enter pharmacy into here including location and phone number: n/a       Preferred Method of Contact: Phone Call  Patient's Preferred Phone Number on File: [phone]   Best Call Back Number, if different:  Additional Information:  "

## 2025-08-04 NOTE — TELEPHONE ENCOUNTER
Copied from CRM #[id number]. Topic: General Inquiry - Information Request  >> Aug 1, 2025 11:01 JILLIAN Stein wrote:  Who Called: Steven Xavier    Does the patient already have the specialty appointment scheduled?:no  If yes, what is the date of that appointment?  Referral to What Specialty:orthopedic  Reason for Referral:numbness in hand and dropping things, difficult to hold things in right hand  Does the patient want the referral with a specific physician?:no  If yes, which provider?:   [facility]    Preferred Method of Contact: Phone Call  Patient's Preferred Phone Number on File: [phone]   Best Call Back Number, if different:  Additional Information: Patient is requesting a callback.

## 2025-08-05 NOTE — TELEPHONE ENCOUNTER
Called and spoke to pt, he went to [facility] and they are trying to get pt a MRI. Pt did state he had a fall yesterday and would like an appt with . pt is scheduled tomorrow.

## 2025-08-05 NOTE — PROGRESS NOTES
"Subjective:       Patient ID: Steven Xavier is a 75 y.o. male.    Chief Complaint: Loss of feeling (Right lower arm )    Established patient, presents to the clinic for follow-up after a recent medical visit.  In the past few weeks, he has had two distinct episodes that have involved his right distal arm, involving the elbow to his fingers.  Essentially, the distal right harm has "gone dead" on two separate occasions.  One occasion lasted 20 minutes, the other episode lasted an entire day.  He had coinciding mild dizziness during the 20 minutes, no additional symptoms for the 2nd episode.  He was evaluated at a local orthopedic urgent [facility], where he had an x-ray of his right arm, and neck.  Nothing specific was found.  He was set up to have an MRI of his neck, and given some prednisone to take by mouth.  ER precautions were given.    Not experience any pain, had no other significant symptoms, and at this point does not seem to be experiencing any residual weakness.  He does state that his right distal arm does not feel quite normal, but it is difficult to describe.  At no point did he have any headache, slurred speech, chest pain, dyspnea, or other significant symptoms.      Review of Systems   Constitutional: Negative.  Negative for fatigue and fever.   HENT: Negative.  Negative for sore throat and trouble swallowing.    Eyes: Negative.  Negative for redness and visual disturbance.   Respiratory: Negative.  Negative for chest tightness and shortness of breath.    Cardiovascular: Negative.  Negative for chest pain.   Gastrointestinal: Negative.  Negative for abdominal pain, blood in stool and nausea.   Endocrine: Negative.  Negative for cold intolerance, heat intolerance and polyuria.   Genitourinary: Negative.    Musculoskeletal: Negative.  Negative for arthralgias, gait problem and joint swelling.   Integumentary:  Negative for rash. Negative.   Neurological:  Positive for weakness (Right distal arm). Negative for " "dizziness and headaches.   Psychiatric/Behavioral: Negative.  Negative for agitation and dysphoric mood.            Patient Care Team:  Jitendra Garvin MD as PCP - General (Family Medicine)  Oswaldo Brown MD as Consulting Physician (Cardiology)  Ilir Haines LPN as Care Coordinator  [facility]  Objective:     Vitals:    08/06/25 1506   BP: 137/81   Pulse: 62   Resp: 18   SpO2: 99%   Weight: 99.8 kg (220 lb)   Height: 5' 5" (1.651 m)   Body mass index is 36.61 kg/m².     Physical Exam  Constitutional:       Appearance: Normal appearance.   HENT:      Head: Normocephalic.      Mouth/Throat:      Mouth: Mucous membranes are moist.      Pharynx: Oropharynx is clear.   Eyes:      Conjunctiva/sclera: Conjunctivae normal.      Pupils: Pupils are equal, round, and reactive to light.   Cardiovascular:      Rate and Rhythm: Normal rate and regular rhythm.      Heart sounds: Normal heart sounds.   Pulmonary:      Effort: Pulmonary effort is normal.      Breath sounds: Normal breath sounds.   Abdominal:      General: Abdomen is flat.      Palpations: Abdomen is soft.   Musculoskeletal:         General: Normal range of motion.      Cervical back: Neck supple.   Skin:     General: Skin is warm and dry.   Neurological:      General: No focal deficit present.      Mental Status: He is alert and oriented to person, place, and time.   Psychiatric:         Mood and Affect: Mood normal.         Behavior: Behavior normal.         Thought Content: Thought content normal.         Judgment: Judgment normal.           Assessment:       ICD-10-CM ICD-9-CM   1. Right arm weakness  R29.898 729.89       Current Outpatient Medications   Medication Instructions    albuterol (PROVENTIL/VENTOLIN HFA) 90 mcg/actuation inhaler Inhale into the lungs.    aspirin (ECOTRIN) 81 mg    clopidogreL (PLAVIX) 75 mg, Daily    furosemide (LASIX) 40 mg, Daily    hydroCHLOROthiazide 12.5 mg, Oral, Daily    levothyroxine (SYNTHROID) 88 mcg, " Oral, Before breakfast    metoprolol tartrate (LOPRESSOR) 25 mg, Daily    mv-mn/iron/folic acid/herb 190 (VITAMIN D3 COMPLETE ORAL) Take by mouth.    nitroGLYCERIN (NITROSTAT) 0.4 mg, Sublingual, Every 5 min PRN    NORVASC 2.5 mg, Nightly    omeprazole (PRILOSEC) 20 mg, Oral, Daily    pravastatin (PRAVACHOL) 40 mg, Oral, Daily    predniSONE (DELTASONE) 10 MG tablet Take one tablet BID x3 days and then one tablet QD x3 days.    sertraline (ZOLOFT) 50 mg, Oral, Daily    sildenafiL (VIAGRA) 100 mg, Oral, Daily PRN    vitamin D (VITAMIN D3) 5,000 Units    zinc sulfate (ZINCATE) 50 mg, Oral     Plan:     Problem List Items Addressed This Visit          Orthopedic    Right arm weakness - Primary    Overview   I am not sure as to the etiology of his symptoms.  Because only his distal arm was involved, I wonder about distal pathology, either the wrist or the forearm might require further evaluation.  For now, he will undergo the MRI of his neck to see if there is any pathology.  He does understand the implications of a CVA, and what symptoms to watch for.  He also understands that if he does not find anything during this evaluation, he may need to see me for further evaluation if his symptoms recur.                I spent approximately 30 minutes on this clinical visit, over half of that time face-to-face with the patient discussing the particular diagnosis and the importance of compliance with the treatment plan.    Follow up for next appointment as scheduled or as needed.    This note was created with the assistance of my[employer] voice recognition software or phone dictation. There may be transcription errors as a result of using this technology. Effort has been made to assure accuracy of transcription but any obvious errors or omissions should be clarified with the author of the document.

## 2025-08-06 ENCOUNTER — OFFICE VISIT (OUTPATIENT)
Dept: PRIMARY CARE CLINIC | Facility: CLINIC | Age: 75
End: 2025-08-06
Payer: MEDICARE

## 2025-08-06 VITALS
DIASTOLIC BLOOD PRESSURE: 81 MMHG | HEART RATE: 62 BPM | WEIGHT: 220 LBS | BODY MASS INDEX: 36.65 KG/M2 | RESPIRATION RATE: 18 BRPM | HEIGHT: 65 IN | SYSTOLIC BLOOD PRESSURE: 137 MMHG | OXYGEN SATURATION: 99 %

## 2025-08-06 DIAGNOSIS — R29.898 RIGHT ARM WEAKNESS: Primary | ICD-10-CM

## 2025-08-06 RX ORDER — PREDNISONE 10 MG/1
TABLET ORAL
COMMUNITY
Start: 2025-08-04

## 2025-08-06 RX ORDER — ZINC SULFATE 50(220)MG
50 CAPSULE ORAL
COMMUNITY

## 2025-08-25 DIAGNOSIS — M54.12 CERVICAL RADICULITIS: Primary | ICD-10-CM

## 2025-08-25 DIAGNOSIS — R53.1 WEAKNESS: ICD-10-CM
